# Patient Record
Sex: FEMALE | Race: ASIAN | NOT HISPANIC OR LATINO | Employment: UNEMPLOYED
[De-identification: names, ages, dates, MRNs, and addresses within clinical notes are randomized per-mention and may not be internally consistent; named-entity substitution may affect disease eponyms.]

---

## 2017-05-30 ENCOUNTER — HOSPITAL (OUTPATIENT)
Dept: OTHER | Age: 37
End: 2017-05-30
Attending: OBSTETRICS & GYNECOLOGY

## 2017-07-17 ENCOUNTER — HOSPITAL (OUTPATIENT)
Dept: OTHER | Age: 37
End: 2017-07-17
Attending: OBSTETRICS & GYNECOLOGY

## 2017-07-18 LAB
ANALYZER ANC (IANC): ABNORMAL
ERYTHROCYTE [DISTWIDTH] IN BLOOD: 12.3 % (ref 11–15)
HEMATOCRIT: 32.2 % (ref 36–46.5)
HGB BLD-MCNC: 11.1 GM/DL (ref 12–15.5)
MCH RBC QN AUTO: 31.8 PG (ref 26–34)
MCHC RBC AUTO-ENTMCNC: 34.5 GM/DL (ref 32–36.5)
MCV RBC AUTO: 92.3 FL (ref 78–100)
PLATELET # BLD: 295 THOUSAND/MCL (ref 140–450)
RBC # BLD: 3.49 MILLION/MCL (ref 4–5.2)
WBC # BLD: 16.4 THOUSAND/MCL (ref 4.2–11)

## 2019-04-01 ENCOUNTER — TELEPHONE (OUTPATIENT)
Dept: SCHEDULING | Age: 39
End: 2019-04-01

## 2019-04-02 ENCOUNTER — WALK IN (OUTPATIENT)
Dept: URGENT CARE | Age: 39
End: 2019-04-02

## 2019-04-02 VITALS
HEART RATE: 56 BPM | BODY MASS INDEX: 25.19 KG/M2 | DIASTOLIC BLOOD PRESSURE: 72 MMHG | RESPIRATION RATE: 16 BRPM | SYSTOLIC BLOOD PRESSURE: 118 MMHG | WEIGHT: 120 LBS | HEIGHT: 58 IN | TEMPERATURE: 98.3 F

## 2019-04-02 DIAGNOSIS — Z23 NEED FOR PROPHYLACTIC VACCINATION AND INOCULATION AGAINST VIRAL HEPATITIS: Primary | ICD-10-CM

## 2019-04-02 PROCEDURE — 90746 HEPB VACCINE 3 DOSE ADULT IM: CPT | Performed by: NURSE PRACTITIONER

## 2019-04-02 PROCEDURE — 90471 IMMUNIZATION ADMIN: CPT | Performed by: NURSE PRACTITIONER

## 2019-04-02 RX ORDER — TOPIRAMATE 25 MG/1
25 TABLET ORAL 2 TIMES DAILY
COMMUNITY

## 2019-04-02 RX ORDER — METOPROLOL SUCCINATE 25 MG/1
25 TABLET, EXTENDED RELEASE ORAL DAILY
COMMUNITY

## 2019-04-02 RX ORDER — LEVOTHYROXINE SODIUM 0.05 MG/1
50 TABLET ORAL DAILY
COMMUNITY

## 2019-04-05 ENCOUNTER — WALK IN (OUTPATIENT)
Dept: URGENT CARE | Age: 39
End: 2019-04-05

## 2019-04-05 VITALS — TEMPERATURE: 97.6 F

## 2019-04-05 DIAGNOSIS — Z23 IMMUNIZATION DUE: Primary | ICD-10-CM

## 2019-04-05 PROCEDURE — 90471 IMMUNIZATION ADMIN: CPT | Performed by: NURSE PRACTITIONER

## 2019-04-05 PROCEDURE — 90716 VAR VACCINE LIVE SUBQ: CPT | Performed by: NURSE PRACTITIONER

## 2019-05-03 ENCOUNTER — WALK IN (OUTPATIENT)
Dept: URGENT CARE | Age: 39
End: 2019-05-03

## 2019-05-03 VITALS — TEMPERATURE: 97.8 F

## 2019-05-03 DIAGNOSIS — Z23 IMMUNIZATION DUE: Primary | ICD-10-CM

## 2019-05-03 PROCEDURE — 90471 IMMUNIZATION ADMIN: CPT | Performed by: NURSE PRACTITIONER

## 2019-05-03 PROCEDURE — 90716 VAR VACCINE LIVE SUBQ: CPT | Performed by: NURSE PRACTITIONER

## 2019-05-03 PROCEDURE — 90472 IMMUNIZATION ADMIN EACH ADD: CPT | Performed by: NURSE PRACTITIONER

## 2019-05-03 PROCEDURE — 90746 HEPB VACCINE 3 DOSE ADULT IM: CPT | Performed by: NURSE PRACTITIONER

## 2019-09-25 ENCOUNTER — WALK IN (OUTPATIENT)
Dept: URGENT CARE | Age: 39
End: 2019-09-25

## 2019-09-25 VITALS
RESPIRATION RATE: 16 BRPM | TEMPERATURE: 98.3 F | WEIGHT: 124 LBS | HEIGHT: 58 IN | SYSTOLIC BLOOD PRESSURE: 120 MMHG | OXYGEN SATURATION: 99 % | HEART RATE: 64 BPM | DIASTOLIC BLOOD PRESSURE: 80 MMHG | BODY MASS INDEX: 26.03 KG/M2

## 2019-09-25 DIAGNOSIS — Z00.00 PE (PHYSICAL EXAM), ROUTINE: ICD-10-CM

## 2019-09-25 DIAGNOSIS — Z23 INFLUENZA VACCINE NEEDED: Primary | ICD-10-CM

## 2019-09-25 DIAGNOSIS — Z02.0 SCHOOL PHYSICAL EXAM: ICD-10-CM

## 2019-09-25 PROBLEM — G43.909 MIGRAINES: Status: ACTIVE | Noted: 2019-09-25

## 2019-09-25 PROBLEM — I10 HYPERTENSION: Status: ACTIVE | Noted: 2019-09-25

## 2019-09-25 PROBLEM — E03.9 HYPOTHYROIDISM: Status: ACTIVE | Noted: 2019-09-25

## 2019-09-25 PROCEDURE — 99395 PREV VISIT EST AGE 18-39: CPT | Performed by: NURSE PRACTITIONER

## 2019-09-25 PROCEDURE — 90471 IMMUNIZATION ADMIN: CPT | Performed by: NURSE PRACTITIONER

## 2019-09-25 PROCEDURE — 90686 IIV4 VACC NO PRSV 0.5 ML IM: CPT | Performed by: NURSE PRACTITIONER

## 2019-09-25 ASSESSMENT — ENCOUNTER SYMPTOMS
HEMATOLOGIC/LYMPHATIC NEGATIVE: 1
NEUROLOGICAL NEGATIVE: 1
GASTROINTESTINAL NEGATIVE: 1
RESPIRATORY NEGATIVE: 1
EYES NEGATIVE: 1
ENDOCRINE NEGATIVE: 1
CONSTITUTIONAL NEGATIVE: 1
PSYCHIATRIC NEGATIVE: 1

## 2019-10-09 ENCOUNTER — WALK IN (OUTPATIENT)
Dept: URGENT CARE | Age: 39
End: 2019-10-09

## 2019-10-09 DIAGNOSIS — Z23 NEED FOR IMMUNIZATION AGAINST INFLUENZA: Primary | ICD-10-CM

## 2019-10-09 PROCEDURE — 90471 IMMUNIZATION ADMIN: CPT | Performed by: NURSE PRACTITIONER

## 2019-10-09 PROCEDURE — 90746 HEPB VACCINE 3 DOSE ADULT IM: CPT | Performed by: NURSE PRACTITIONER

## 2020-12-18 DIAGNOSIS — D21.9 FIBROID: ICD-10-CM

## 2020-12-18 DIAGNOSIS — Z98.890 HISTORY OF MYOMECTOMY: Primary | ICD-10-CM

## 2021-01-05 ENCOUNTER — HOSPITAL ENCOUNTER (OUTPATIENT)
Dept: MAMMOGRAPHY | Age: 41
Discharge: HOME | End: 2021-01-05
Attending: OBSTETRICS & GYNECOLOGY

## 2021-01-05 DIAGNOSIS — Z12.31 ENCOUNTER FOR SCREENING MAMMOGRAM FOR BREAST CANCER: ICD-10-CM

## 2021-01-05 PROCEDURE — 77063 BREAST TOMOSYNTHESIS BI: CPT

## 2021-01-05 PROCEDURE — 77067 SCR MAMMO BI INCL CAD: CPT

## 2021-01-12 ENCOUNTER — OFFICE VISIT (OUTPATIENT)
Dept: MATERNAL FETAL MEDICINE | Age: 41
End: 2021-01-12
Attending: OBSTETRICS & GYNECOLOGY

## 2021-01-12 DIAGNOSIS — D21.9 FIBROID: Primary | ICD-10-CM

## 2021-01-12 DIAGNOSIS — N83.202 CYST OF LEFT OVARY: ICD-10-CM

## 2021-01-12 PROCEDURE — 76830 TRANSVAGINAL US NON-OB: CPT | Performed by: OBSTETRICS & GYNECOLOGY

## 2021-01-12 PROCEDURE — 76856 US EXAM PELVIC COMPLETE: CPT | Performed by: OBSTETRICS & GYNECOLOGY

## 2021-01-27 DIAGNOSIS — D21.9 FIBROID: ICD-10-CM

## 2021-01-27 DIAGNOSIS — N83.209 CYST OF OVARY, UNSPECIFIED LATERALITY: Primary | ICD-10-CM

## 2021-04-06 ENCOUNTER — APPOINTMENT (OUTPATIENT)
Dept: MATERNAL FETAL MEDICINE | Age: 41
End: 2021-04-06
Attending: OBSTETRICS & GYNECOLOGY

## 2021-04-22 ENCOUNTER — OFFICE VISIT (OUTPATIENT)
Dept: MATERNAL FETAL MEDICINE | Age: 41
End: 2021-04-22
Attending: OBSTETRICS & GYNECOLOGY

## 2021-04-22 DIAGNOSIS — D21.9 FIBROIDS: Primary | ICD-10-CM

## 2021-04-22 DIAGNOSIS — N83.202 CYST OF LEFT OVARY: ICD-10-CM

## 2021-04-22 PROCEDURE — 76830 TRANSVAGINAL US NON-OB: CPT | Performed by: OBSTETRICS & GYNECOLOGY

## 2021-04-22 PROCEDURE — 76856 US EXAM PELVIC COMPLETE: CPT | Performed by: OBSTETRICS & GYNECOLOGY

## 2021-06-01 ENCOUNTER — OFFICE VISIT (OUTPATIENT)
Dept: FAMILY MEDICINE CLINIC | Facility: CLINIC | Age: 41
End: 2021-06-01
Payer: COMMERCIAL

## 2021-06-01 VITALS
HEART RATE: 71 BPM | DIASTOLIC BLOOD PRESSURE: 77 MMHG | SYSTOLIC BLOOD PRESSURE: 112 MMHG | WEIGHT: 126.38 LBS | RESPIRATION RATE: 16 BRPM | HEIGHT: 58.25 IN | TEMPERATURE: 97 F | BODY MASS INDEX: 26.17 KG/M2

## 2021-06-01 DIAGNOSIS — G43.009 MIGRAINE WITHOUT AURA AND WITHOUT STATUS MIGRAINOSUS, NOT INTRACTABLE: Primary | ICD-10-CM

## 2021-06-01 DIAGNOSIS — E03.9 HYPOTHYROIDISM, ACQUIRED: ICD-10-CM

## 2021-06-01 PROCEDURE — 99203 OFFICE O/P NEW LOW 30 MIN: CPT | Performed by: FAMILY MEDICINE

## 2021-06-01 PROCEDURE — 3078F DIAST BP <80 MM HG: CPT | Performed by: FAMILY MEDICINE

## 2021-06-01 PROCEDURE — 3074F SYST BP LT 130 MM HG: CPT | Performed by: FAMILY MEDICINE

## 2021-06-01 PROCEDURE — 3008F BODY MASS INDEX DOCD: CPT | Performed by: FAMILY MEDICINE

## 2021-06-01 RX ORDER — SUMATRIPTAN 50 MG/1
50 TABLET, FILM COATED ORAL EVERY 2 HOUR PRN
Refills: 0 | COMMUNITY
Start: 2021-06-01

## 2021-06-01 RX ORDER — LEVOTHYROXINE SODIUM 50 MCG
TABLET ORAL
COMMUNITY
Start: 2021-03-16

## 2021-06-01 RX ORDER — LEVOTHYROXINE SODIUM 0.03 MG/1
25 TABLET ORAL
Refills: 0 | COMMUNITY
Start: 2021-06-01 | End: 2021-06-01

## 2021-06-01 RX ORDER — TOPIRAMATE 25 MG/1
TABLET ORAL
COMMUNITY
Start: 2021-06-01 | End: 2021-06-01

## 2021-06-01 RX ORDER — ACETAMINOPHEN AND CODEINE PHOSPHATE 120; 12 MG/5ML; MG/5ML
SOLUTION ORAL
COMMUNITY
Start: 2021-04-17

## 2021-06-01 NOTE — PROGRESS NOTES
HPI/Subjective:   Patient ID: Beverley Sheridan is a 36year old female. Patient presents to establish care and evaluation of headaches as below. Headache   This is a chronic problem. The current episode started more than 1 year ago.  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 At appointment with  Nathan Room. History of myomectomy 2017. Mother diabetes, father hypertension. Taking norethindrone OCP. Diagnosed with onset of migraines 2017.   Experiences throbbing unilateral left-sided o

## 2021-07-15 ENCOUNTER — TELEMEDICINE (OUTPATIENT)
Dept: FAMILY MEDICINE CLINIC | Facility: CLINIC | Age: 41
End: 2021-07-15
Payer: COMMERCIAL

## 2021-07-15 DIAGNOSIS — G43.009 MIGRAINE WITHOUT AURA AND WITHOUT STATUS MIGRAINOSUS, NOT INTRACTABLE: Primary | ICD-10-CM

## 2021-07-15 PROCEDURE — 99212 OFFICE O/P EST SF 10 MIN: CPT | Performed by: FAMILY MEDICINE

## 2021-07-15 NOTE — PROGRESS NOTES
HPI/Subjective:   Patient ID: Gilmar Dutta is a 36year old female. This visit is conducted using Telemedicine with live, interactive video and audio.     Patient has been referred to the [facility] website at [email or web address] to review the yearl

## 2021-07-24 ENCOUNTER — PATIENT MESSAGE (OUTPATIENT)
Dept: FAMILY MEDICINE CLINIC | Facility: CLINIC | Age: 41
End: 2021-07-24

## 2021-07-24 DIAGNOSIS — Z11.1 SCREENING-PULMONARY TB: Primary | ICD-10-CM

## 2021-07-25 NOTE — TELEPHONE ENCOUNTER
Dr Bishop Flower message, pended order, please check if this is the right dx code before signing. From: Laura Espinosa  To: Yusef Hope. Clint Charles MD  Sent: 7/24/2021  2:22 PM CDT  Subject: Other    Do you do TB Test (Quantiferon) ?  I need to do the test for

## 2021-07-27 ENCOUNTER — LAB ENCOUNTER (OUTPATIENT)
Dept: LAB | Age: 41
End: 2021-07-27
Attending: FAMILY MEDICINE
Payer: COMMERCIAL

## 2021-07-27 DIAGNOSIS — Z11.1 SCREENING-PULMONARY TB: ICD-10-CM

## 2021-07-27 PROCEDURE — 86480 TB TEST CELL IMMUN MEASURE: CPT

## 2021-07-27 PROCEDURE — 36415 COLL VENOUS BLD VENIPUNCTURE: CPT

## 2021-07-29 LAB
M TB IFN-G CD4+ T-CELLS BLD-ACNC: 0.11 IU/ML
M TB TUBERC IFN-G BLD QL: NEGATIVE
M TB TUBERC IGNF/MITOGEN IGNF CONTROL: >10 IU/ML
QFT TB1 AG MINUS NIL: 0.21 IU/ML
QFT TB2 AG MINUS NIL: 0.16 IU/ML

## 2021-11-19 DIAGNOSIS — O34.10 TUMORS OF BODY OF UTERUS, ANTEPARTUM CONDITION OR COMPLICATION: Primary | ICD-10-CM

## 2021-12-21 ENCOUNTER — APPOINTMENT (OUTPATIENT)
Dept: MATERNAL FETAL MEDICINE | Age: 41
End: 2021-12-21
Attending: OBSTETRICS & GYNECOLOGY

## 2022-01-17 ENCOUNTER — APPOINTMENT (OUTPATIENT)
Dept: MATERNAL FETAL MEDICINE | Age: 42
End: 2022-01-17
Attending: OBSTETRICS & GYNECOLOGY

## 2022-02-07 ENCOUNTER — APPOINTMENT (OUTPATIENT)
Dept: MATERNAL FETAL MEDICINE | Age: 42
End: 2022-02-07
Attending: OBSTETRICS & GYNECOLOGY

## 2022-03-17 ENCOUNTER — WALK IN (OUTPATIENT)
Dept: URGENT CARE | Age: 42
End: 2022-03-17

## 2022-03-17 VITALS
DIASTOLIC BLOOD PRESSURE: 80 MMHG | SYSTOLIC BLOOD PRESSURE: 130 MMHG | OXYGEN SATURATION: 98 % | WEIGHT: 128.2 LBS | BODY MASS INDEX: 26.91 KG/M2 | TEMPERATURE: 98 F | RESPIRATION RATE: 18 BRPM | HEIGHT: 58 IN | HEART RATE: 59 BPM

## 2022-03-17 DIAGNOSIS — Z02.1 PHYSICAL EXAM, PRE-EMPLOYMENT: Primary | ICD-10-CM

## 2022-03-17 PROCEDURE — X0945 SELF PAY APN OR PA PERFORMED ADMINISTRATIVE PHYSICAL: HCPCS | Performed by: NURSE PRACTITIONER

## 2022-03-17 ASSESSMENT — ENCOUNTER SYMPTOMS
SHORTNESS OF BREATH: 0
CHEST TIGHTNESS: 0
RESPIRATORY NEGATIVE: 1
WEAKNESS: 0
EYES NEGATIVE: 1
ABDOMINAL PAIN: 0
CHILLS: 0
SEIZURES: 0
COUGH: 0
DIARRHEA: 0
PSYCHIATRIC NEGATIVE: 1
ALLERGIC/IMMUNOLOGIC NEGATIVE: 1
NAUSEA: 0
NEUROLOGICAL NEGATIVE: 1
WHEEZING: 0
SINUS PAIN: 0
APPETITE CHANGE: 0
HEMATOLOGIC/LYMPHATIC NEGATIVE: 1
FATIGUE: 0
EYE PAIN: 0
RHINORRHEA: 0
CONSTIPATION: 0
GASTROINTESTINAL NEGATIVE: 1
ENDOCRINE NEGATIVE: 1
LIGHT-HEADEDNESS: 0
CONSTITUTIONAL NEGATIVE: 1
DIZZINESS: 0
VOMITING: 0
SINUS PRESSURE: 0
FEVER: 0
SORE THROAT: 0
HEADACHES: 0
TROUBLE SWALLOWING: 0

## 2022-03-25 ENCOUNTER — OFFICE VISIT (OUTPATIENT)
Dept: FAMILY MEDICINE CLINIC | Facility: CLINIC | Age: 42
End: 2022-03-25
Payer: COMMERCIAL

## 2022-03-25 ENCOUNTER — LAB ENCOUNTER (OUTPATIENT)
Dept: LAB | Age: 42
End: 2022-03-25
Attending: FAMILY MEDICINE
Payer: COMMERCIAL

## 2022-03-25 VITALS
RESPIRATION RATE: 19 BRPM | SYSTOLIC BLOOD PRESSURE: 138 MMHG | DIASTOLIC BLOOD PRESSURE: 82 MMHG | HEART RATE: 78 BPM | HEIGHT: 58 IN | BODY MASS INDEX: 26.66 KG/M2 | WEIGHT: 127 LBS | TEMPERATURE: 99 F

## 2022-03-25 DIAGNOSIS — Z11.1 SCREENING-PULMONARY TB: Primary | ICD-10-CM

## 2022-03-25 DIAGNOSIS — Z11.1 SCREENING-PULMONARY TB: ICD-10-CM

## 2022-03-25 PROCEDURE — 86480 TB TEST CELL IMMUN MEASURE: CPT

## 2022-03-25 PROCEDURE — 3079F DIAST BP 80-89 MM HG: CPT | Performed by: FAMILY MEDICINE

## 2022-03-25 PROCEDURE — 3008F BODY MASS INDEX DOCD: CPT | Performed by: FAMILY MEDICINE

## 2022-03-25 PROCEDURE — 3075F SYST BP GE 130 - 139MM HG: CPT | Performed by: FAMILY MEDICINE

## 2022-03-25 PROCEDURE — 99213 OFFICE O/P EST LOW 20 MIN: CPT | Performed by: FAMILY MEDICINE

## 2022-03-25 PROCEDURE — 36415 COLL VENOUS BLD VENIPUNCTURE: CPT

## 2022-03-25 RX ORDER — TOPIRAMATE 25 MG/1
TABLET ORAL
Refills: 0 | COMMUNITY
Start: 2022-03-25

## 2022-03-28 LAB
M TB IFN-G CD4+ T-CELLS BLD-ACNC: 0.27 IU/ML
M TB TUBERC IFN-G BLD QL: NEGATIVE
M TB TUBERC IGNF/MITOGEN IGNF CONTROL: >10 IU/ML
QFT TB1 AG MINUS NIL: 0.34 IU/ML
QFT TB2 AG MINUS NIL: 0.17 IU/ML

## 2022-06-29 ENCOUNTER — V-VISIT (OUTPATIENT)
Dept: FAMILY MEDICINE | Age: 42
End: 2022-06-29

## 2022-06-29 DIAGNOSIS — R69 DIAGNOSIS DEFERRED: Primary | ICD-10-CM

## 2022-06-29 RX ORDER — NORETHINDRONE 0.35 MG/1
TABLET ORAL
COMMUNITY
Start: 2022-05-03

## 2022-09-16 ENCOUNTER — HOSPITAL ENCOUNTER (OUTPATIENT)
Dept: MAMMOGRAPHY | Age: 42
Discharge: HOME | End: 2022-09-16

## 2022-09-16 DIAGNOSIS — Z12.31 ENCOUNTER FOR SCREENING MAMMOGRAM FOR MALIGNANT NEOPLASM OF BREAST: ICD-10-CM

## 2022-09-16 PROCEDURE — 77063 BREAST TOMOSYNTHESIS BI: CPT

## 2022-09-24 ENCOUNTER — TELEMEDICINE (OUTPATIENT)
Dept: FAMILY MEDICINE CLINIC | Facility: CLINIC | Age: 42
End: 2022-09-24

## 2022-09-24 ENCOUNTER — PATIENT MESSAGE (OUTPATIENT)
Dept: FAMILY MEDICINE CLINIC | Facility: CLINIC | Age: 42
End: 2022-09-24

## 2022-09-24 DIAGNOSIS — R05.2 SUBACUTE COUGH: Primary | ICD-10-CM

## 2022-09-24 DIAGNOSIS — Z86.16 HISTORY OF COVID-19: ICD-10-CM

## 2022-09-24 DIAGNOSIS — J98.01 BRONCHOSPASM: ICD-10-CM

## 2022-09-24 PROCEDURE — 99213 OFFICE O/P EST LOW 20 MIN: CPT | Performed by: FAMILY MEDICINE

## 2022-09-24 RX ORDER — NAPROXEN 375 MG/1
375 TABLET ORAL 2 TIMES DAILY WITH MEALS
Qty: 40 TABLET | Refills: 0 | Status: SHIPPED | OUTPATIENT
Start: 2022-09-24 | End: 2022-09-24

## 2022-09-24 RX ORDER — NAPROXEN 375 MG/1
375 TABLET ORAL 2 TIMES DAILY WITH MEALS
Qty: 40 TABLET | Refills: 0 | Status: SHIPPED | OUTPATIENT
Start: 2022-09-24

## 2022-09-24 RX ORDER — MOMETASONE FUROATE AND FORMOTEROL FUMARATE DIHYDRATE 200; 5 UG/1; UG/1
1 AEROSOL RESPIRATORY (INHALATION) 2 TIMES DAILY
Qty: 1 EACH | Refills: 1 | Status: SHIPPED | OUTPATIENT
Start: 2022-09-24

## 2022-09-24 RX ORDER — MOMETASONE FUROATE AND FORMOTEROL FUMARATE DIHYDRATE 200; 5 UG/1; UG/1
1 AEROSOL RESPIRATORY (INHALATION) 2 TIMES DAILY
Qty: 1 EACH | Refills: 1 | Status: SHIPPED | OUTPATIENT
Start: 2022-09-24 | End: 2022-09-24

## 2022-09-26 NOTE — TELEPHONE ENCOUNTER
PA completed. Approved for 2 months.   8/27/22-11/25/22    Case ID# [id number]    Left message for patient informing her.

## 2023-01-18 RX ORDER — TOPIRAMATE 25 MG/1
25 TABLET ORAL 2 TIMES DAILY
Qty: 180 TABLET | Refills: 1 | Status: SHIPPED | OUTPATIENT
Start: 2023-01-18

## 2023-01-18 NOTE — TELEPHONE ENCOUNTER
Please review. Protocol passed but never prescribed by doctor.     Requested Prescriptions   Pending Prescriptions Disp Refills    topiramate 25 MG Oral Tab  0     Sig: AT THE START OF THERAPY TAKE 1 TABLET DAILY FOR 3 DAYS THEN TAKE 1 TABLET IN THE MORNING AND 1 TABLET AT NIGHT THEREAFTER       Neurology Medications Passed - 1/17/2023  7:52 PM        Passed - In person appointment or virtual visit in the past 6 mos or appointment in next 3 mos     Recent Outpatient Visits              3 months ago Subacute cough    Temitope Zelaya, McGregorCatalino MD    Telemedicine    9 months ago Screening-pulmonary TB    Nader Pederson, David Bess, [facility] Michelle Cramer MD    Office Visit    1 year ago Migraine without aura and without status migrainosus, not intractable    [facility], [place] Bess, [address] [place] [address] MD Yordan    Telemedicine    1 year ago Migraine without aura and without status migrainosus, not intractable    [facility] Michelle Cramer MD    Office Visit          Future Appointments         Provider Department Appt Notes    In 1 month MD Temitope Jennings [place], [facility] Physical assessment for nursing school requirements, medication rx transfer,                    Recent Outpatient Visits              3 months ago Subacute cough    Temitope Zelaya, [facility] Michelle Cramer MD    Telemedicine    9 months ago Screening-pulmonary TB    Temitope Zelaya, [facility] Michelle Cramer MD    Office Visit    1 year ago Migraine without aura and without status migrainosus, not intractable    [facility], [address], [address] [facility] MD Yordan    Telemedicine    1 year ago Migraine without aura and without status migrainosus, not intractable    [facility], [place], Dior Ochoa MD    Office Visit            Future Appointments         Provider Department Appt Notes    In 1 month Roseanna Singh MD [address] [facility], [facility] Physical assessment for nursing school requirements, medication rx transfer,

## 2023-03-08 ENCOUNTER — OFFICE VISIT (OUTPATIENT)
Dept: FAMILY MEDICINE CLINIC | Facility: CLINIC | Age: 43
End: 2023-03-08

## 2023-03-08 ENCOUNTER — LAB ENCOUNTER (OUTPATIENT)
Dept: LAB | Age: 43
End: 2023-03-08
Attending: FAMILY MEDICINE
Payer: COMMERCIAL

## 2023-03-08 VITALS
TEMPERATURE: 98 F | WEIGHT: 129 LBS | DIASTOLIC BLOOD PRESSURE: 88 MMHG | HEIGHT: 58 IN | SYSTOLIC BLOOD PRESSURE: 127 MMHG | BODY MASS INDEX: 27.08 KG/M2 | HEART RATE: 62 BPM

## 2023-03-08 DIAGNOSIS — Z11.1 TUBERCULOSIS SCREENING: ICD-10-CM

## 2023-03-08 DIAGNOSIS — Z00.00 ANNUAL PHYSICAL EXAM: ICD-10-CM

## 2023-03-08 DIAGNOSIS — G43.009 MIGRAINE WITHOUT AURA AND WITHOUT STATUS MIGRAINOSUS, NOT INTRACTABLE: ICD-10-CM

## 2023-03-08 DIAGNOSIS — D25.9 UTERINE LEIOMYOMA, UNSPECIFIED LOCATION: ICD-10-CM

## 2023-03-08 DIAGNOSIS — I10 ESSENTIAL HYPERTENSION: ICD-10-CM

## 2023-03-08 DIAGNOSIS — N85.2 UTERINE ENLARGEMENT: ICD-10-CM

## 2023-03-08 DIAGNOSIS — E03.9 HYPOTHYROIDISM (ACQUIRED): ICD-10-CM

## 2023-03-08 DIAGNOSIS — Z01.419 ENCOUNTER FOR GYNECOLOGICAL EXAMINATION WITHOUT ABNORMAL FINDING: Primary | ICD-10-CM

## 2023-03-08 DIAGNOSIS — Z12.31 BREAST CANCER SCREENING BY MAMMOGRAM: ICD-10-CM

## 2023-03-08 LAB
ANION GAP SERPL CALC-SCNC: 6 MMOL/L (ref 0–18)
BUN BLD-MCNC: 8 MG/DL (ref 7–18)
BUN/CREAT SERPL: 11.9 (ref 10–20)
CALCIUM BLD-MCNC: 8.5 MG/DL (ref 8.5–10.1)
CHLORIDE SERPL-SCNC: 110 MMOL/L (ref 98–112)
CHOLEST SERPL-MCNC: 110 MG/DL (ref ?–200)
CO2 SERPL-SCNC: 24 MMOL/L (ref 21–32)
CREAT BLD-MCNC: 0.67 MG/DL
DEPRECATED RDW RBC AUTO: 44.3 FL (ref 35.1–46.3)
ERYTHROCYTE [DISTWIDTH] IN BLOOD BY AUTOMATED COUNT: 12.5 % (ref 11–15)
FASTING PATIENT LIPID ANSWER: NO
FASTING STATUS PATIENT QL REPORTED: NO
GFR SERPLBLD BASED ON 1.73 SQ M-ARVRAT: 112 ML/MIN/1.73M2 (ref 60–?)
GLUCOSE BLD-MCNC: 88 MG/DL (ref 70–99)
HCT VFR BLD AUTO: 40.6 %
HDLC SERPL-MCNC: 72 MG/DL (ref 40–59)
HGB BLD-MCNC: 13.3 G/DL
LDLC SERPL CALC-MCNC: 24 MG/DL (ref ?–100)
MCH RBC QN AUTO: 31.4 PG (ref 26–34)
MCHC RBC AUTO-ENTMCNC: 32.8 G/DL (ref 31–37)
MCV RBC AUTO: 96 FL
NONHDLC SERPL-MCNC: 38 MG/DL (ref ?–130)
OSMOLALITY SERPL CALC.SUM OF ELEC: 288 MOSM/KG (ref 275–295)
PLATELET # BLD AUTO: 366 10(3)UL (ref 150–450)
POTASSIUM SERPL-SCNC: 3.5 MMOL/L (ref 3.5–5.1)
RBC # BLD AUTO: 4.23 X10(6)UL
SODIUM SERPL-SCNC: 140 MMOL/L (ref 136–145)
TRIGL SERPL-MCNC: 67 MG/DL (ref 30–149)
TSI SER-ACNC: 1.64 MIU/ML (ref 0.36–3.74)
VLDLC SERPL CALC-MCNC: 9 MG/DL (ref 0–30)
WBC # BLD AUTO: 10 X10(3) UL (ref 4–11)

## 2023-03-08 PROCEDURE — 99396 PREV VISIT EST AGE 40-64: CPT | Performed by: FAMILY MEDICINE

## 2023-03-08 PROCEDURE — 36415 COLL VENOUS BLD VENIPUNCTURE: CPT

## 2023-03-08 PROCEDURE — 3008F BODY MASS INDEX DOCD: CPT | Performed by: FAMILY MEDICINE

## 2023-03-08 PROCEDURE — 80061 LIPID PANEL: CPT

## 2023-03-08 PROCEDURE — 86480 TB TEST CELL IMMUN MEASURE: CPT

## 2023-03-08 PROCEDURE — 80048 BASIC METABOLIC PNL TOTAL CA: CPT

## 2023-03-08 PROCEDURE — 85027 COMPLETE CBC AUTOMATED: CPT

## 2023-03-08 PROCEDURE — 3079F DIAST BP 80-89 MM HG: CPT | Performed by: FAMILY MEDICINE

## 2023-03-08 PROCEDURE — 3074F SYST BP LT 130 MM HG: CPT | Performed by: FAMILY MEDICINE

## 2023-03-08 PROCEDURE — 84443 ASSAY THYROID STIM HORMONE: CPT

## 2023-03-08 RX ORDER — METOPROLOL SUCCINATE 25 MG/1
25 TABLET, EXTENDED RELEASE ORAL DAILY
COMMUNITY
Start: 2022-09-09

## 2023-03-09 ENCOUNTER — PATIENT MESSAGE (OUTPATIENT)
Dept: FAMILY MEDICINE CLINIC | Facility: CLINIC | Age: 43
End: 2023-03-09

## 2023-03-10 LAB
M TB IFN-G CD4+ T-CELLS BLD-ACNC: 0.64 IU/ML
M TB TUBERC IFN-G BLD QL: NEGATIVE
M TB TUBERC IGNF/MITOGEN IGNF CONTROL: >10 IU/ML
QFT TB1 AG MINUS NIL: 0.19 IU/ML
QFT TB2 AG MINUS NIL: 0.12 IU/ML

## 2023-03-10 NOTE — TELEPHONE ENCOUNTER
From: Perla Gaming  To: Humphrey Cardoso. Renuka Jerez MD  Sent: 3/9/2023 4:50 PM CST  Subject: TB quantiferon test result    I just want to follow up on the result or inquire about the turn around time.  Thank you

## 2023-03-15 RX ORDER — ACETAMINOPHEN AND CODEINE PHOSPHATE 120; 12 MG/5ML; MG/5ML
0.35 SOLUTION ORAL DAILY
Qty: 3 EACH | Refills: 3 | OUTPATIENT
Start: 2023-03-15

## 2023-03-27 LAB — HPV I/H RISK 1 DNA SPEC QL NAA+PROBE: NEGATIVE

## 2023-04-05 RX ORDER — ACETAMINOPHEN AND CODEINE PHOSPHATE 120; 12 MG/5ML; MG/5ML
0.35 SOLUTION ORAL DAILY
Qty: 28 TABLET | Refills: 0 | Status: SHIPPED | OUTPATIENT
Start: 2023-04-05

## 2023-04-05 NOTE — TELEPHONE ENCOUNTER
Protocol failed or has No Protocol, please review    Routing as Dr. Rocco Silva  is out of office ,  med is listed as patient reported   THINPREP PAP WITH HPV REFLEX REQUEST: Patient Communication     Append Comments   Seen    Your Pap smear does not show any signs of cancer, but they were some changes of \"undetermined significance\". The test for the HPV virus which causes cervical cancer is negative. At this time, I recommend we repeat the Pap smear in 1 year.    Written by Brien Sever, MD on 3/27/2023  4:26 PM CDT  Seen by patient Martir Mcdonald on 4/4/2023  9:09 AM        Requested Prescriptions   Pending Prescriptions Disp Refills    Norethindrone 0.35 MG Oral Tab 28 tablet 0       Gynecology Medication Protocol Failed - 4/4/2023  9:04 AM        Failed - Pass dependent on manual look-up of last PAP and patient compliance with PAP follow up recommendations        Passed - In person appointment or virtual visit in the past 12 mos or appointment in next 3 mos     Recent Outpatient Visits              4 weeks ago Encounter for gynecological examination without abnormal finding    [address], [address] Aden Donahue MD    Office Visit    6 months ago Subacute cough    Ronald Muhammad [place], Carmen Castaneda MD    Telemedicine    1 year ago Screening-pulmonary TB    [address] Aden Donahue MD    Office Visit    1 year ago Migraine without aura and without status migrainosus, not intractable    [facility], [address], [address] [facility] MD Yordan    Telemedicine    1 year ago Migraine without aura and without status migrainosus, not intractable    [facility], [facility], [address] [facility] MD Yordan    Office Visit          Future Appointments         Provider Department Appt Notes    In 5 months Aden Donahue MD [address], [address] as per the doctor, need a follow-up on hypertension every 6 months                  Future Appointments         Provider Department Appt Notes    In 5 months Yohannes Cheung MD [address], [facility], [facility] as per the doctor, need a follow-up on hypertension every 6 months          Recent Outpatient Visits              4 weeks ago Encounter for gynecological examination without abnormal finding    [address] [facility], [address] [facility] MD Yordan    Office Visit    6 months ago Subacute cough    [address] [facility] Yohannes Cheung MD    Telemedicine    1 year ago Screening-pulmonary TB    [address], [address], [address] [facility] [address], MD    Office Visit    1 year ago Migraine without aura and without status migrainosus, not intractable    [facility], [address], [address] [facility], MD    Telemedicine    1 year ago Migraine without aura and without status migrainosus, not intractable    [address], [address], [address] [facility] [address], MD    Office Visit

## 2023-06-27 ENCOUNTER — OFFICE VISIT (OUTPATIENT)
Dept: FAMILY MEDICINE CLINIC | Facility: CLINIC | Age: 43
End: 2023-06-27

## 2023-06-27 VITALS
HEART RATE: 78 BPM | SYSTOLIC BLOOD PRESSURE: 121 MMHG | WEIGHT: 134.38 LBS | BODY MASS INDEX: 28 KG/M2 | TEMPERATURE: 98 F | DIASTOLIC BLOOD PRESSURE: 85 MMHG

## 2023-06-27 DIAGNOSIS — Z31.69 ENCOUNTER FOR PRECONCEPTION CONSULTATION: ICD-10-CM

## 2023-06-27 DIAGNOSIS — I10 ESSENTIAL HYPERTENSION: Primary | ICD-10-CM

## 2023-06-27 DIAGNOSIS — D25.9 UTERINE LEIOMYOMA, UNSPECIFIED LOCATION: ICD-10-CM

## 2023-06-27 PROCEDURE — 3079F DIAST BP 80-89 MM HG: CPT | Performed by: FAMILY MEDICINE

## 2023-06-27 PROCEDURE — 3074F SYST BP LT 130 MM HG: CPT | Performed by: FAMILY MEDICINE

## 2023-06-27 PROCEDURE — 99213 OFFICE O/P EST LOW 20 MIN: CPT | Performed by: FAMILY MEDICINE

## 2023-06-27 RX ORDER — MAGNESIUM OXIDE 400 MG (241.3 MG MAGNESIUM) TABLET
800 TABLET DAILY
Qty: 30 TABLET | Refills: 0 | Status: SHIPPED | OUTPATIENT
Start: 2023-06-27 | End: 2023-07-03

## 2023-07-03 RX ORDER — MAGNESIUM OXIDE 400 MG (241.3 MG MAGNESIUM) TABLET
800 TABLET DAILY
Qty: 30 TABLET | Refills: 0 | Status: SHIPPED | OUTPATIENT
Start: 2023-07-03

## 2023-10-03 ENCOUNTER — HOSPITAL ENCOUNTER (OUTPATIENT)
Dept: ULTRASOUND IMAGING | Age: 43
Discharge: HOME | End: 2023-10-03
Attending: FAMILY MEDICINE
Payer: COMMERCIAL

## 2023-10-03 DIAGNOSIS — N85.2 UTERINE ENLARGEMENT: ICD-10-CM

## 2023-10-03 PROCEDURE — 76856 US EXAM PELVIC COMPLETE: CPT | Performed by: FAMILY MEDICINE

## 2023-10-03 PROCEDURE — 76830 TRANSVAGINAL US NON-OB: CPT | Performed by: FAMILY MEDICINE

## 2023-10-18 RX ORDER — LEVOTHYROXINE SODIUM 50 MCG
50 TABLET ORAL
Qty: 90 TABLET | Refills: 1 | Status: SHIPPED | OUTPATIENT
Start: 2023-10-18

## 2023-10-18 NOTE — TELEPHONE ENCOUNTER
Rx listed as historical. pls advise, thanks in advance.          Refill passed per [facility] protocol   Requested Prescriptions   Pending Prescriptions Disp Refills    SYNTHROID 50 MCG Oral Tab  0       Thyroid Medication Protocol Passed - 10/17/2023  7:59 AM        Passed - TSH in past 12 months        Passed - Last TSH value is normal     Lab Results   Component Value Date    TSH 1.640 03/08/2023                 Passed - In person appointment or virtual visit in the past 12 mos or appointment in next 3 mos     Recent Outpatient Visits              3 months ago Essential hypertension    [facility] Budge, MD    Office Visit    7 months ago Encounter for gynecological examination without abnormal finding    [facility], MD    Office Visit    1 year ago Subacute cough    [facility] [address], MD    Telemedicine    1 year ago Screening-pulmonary TB    [facility] [address], MD    Office Visit    2 years ago Migraine without aura and without status migrainosus, not intractable    [facility], [address], [address] [facility] MD Yordan    Telemedicine          Future Appointments         Provider Department Appt Notes    In 2 days DO Esme Larson, [address], [address]     In 1 month [address] [place] [address] Rd

## 2023-10-19 ENCOUNTER — OFFICE VISIT (OUTPATIENT)
Dept: OBGYN CLINIC | Facility: CLINIC | Age: 43
End: 2023-10-19
Payer: COMMERCIAL

## 2023-10-19 VITALS
WEIGHT: 142 LBS | SYSTOLIC BLOOD PRESSURE: 124 MMHG | HEIGHT: 58 IN | BODY MASS INDEX: 29.81 KG/M2 | DIASTOLIC BLOOD PRESSURE: 84 MMHG

## 2023-10-19 DIAGNOSIS — Z31.69 ENCOUNTER FOR PRECONCEPTION CONSULTATION: ICD-10-CM

## 2023-10-19 DIAGNOSIS — D21.9 FIBROIDS: Primary | ICD-10-CM

## 2023-10-19 PROBLEM — I10 HYPERTENSION: Status: ACTIVE | Noted: 2019-09-25

## 2023-10-19 PROCEDURE — 3074F SYST BP LT 130 MM HG: CPT | Performed by: OBSTETRICS & GYNECOLOGY

## 2023-10-19 PROCEDURE — 3008F BODY MASS INDEX DOCD: CPT | Performed by: OBSTETRICS & GYNECOLOGY

## 2023-10-19 PROCEDURE — 99203 OFFICE O/P NEW LOW 30 MIN: CPT | Performed by: OBSTETRICS & GYNECOLOGY

## 2023-10-19 PROCEDURE — 3079F DIAST BP 80-89 MM HG: CPT | Performed by: OBSTETRICS & GYNECOLOGY

## 2023-10-19 NOTE — PROGRESS NOTES
New Patient GYN History and Physical  [facility]    CHIEF COMPLAINT:  Patient presents with:  Ultrasound: Pt states she was told by previous OB to get US every 6 months due to a myomectomy since she was planning to conceive but hasn't, referred by ; Pelvic US f/u 10/03/23   HISTORY OF PRESENT ILLNESS:   Dariana Birch is a 43year old female   who presents for    Consult for fibroids. Surgery 2017 laparoscopy myomectomy and was told to f/u q 6 months to monitor. Patient's last menstrual period was 2023 (exact date). Monthly / regular, 4-5 days (used last 10 d), light flow, no cramps now    + sex with   No pain - before surgery she had pain with sex    G0 - still interested in pregnancy. Last pap smear 3/2023 ascus hpv neg    No h/o STI      PAST MEDICAL HISTORY:   Past Medical History:   Diagnosis Date    Fibroids     HTN (hypertension)     Hypothyroid     Migraine         PAST SURGICAL HISTORY:   Past Surgical History:   Procedure Laterality Date    Breast biopsy Left     Prior myomectomy          PAST OB HISTORY:  OB History    Para Term  AB Living   0 0 0 0 0 0   SAB IAB Ectopic Multiple Live Births   0 0 0 0 0       CURRENT MEDICATIONS:      Current Outpatient Medications:     SYNTHROID 50 MCG Oral Tab, Take 1 tablet (50 mcg total) by mouth before breakfast., Disp: 90 tablet, Rfl: 1    metoprolol succinate ER 25 MG Oral Tablet 24 Hr, Take 1 tablet (25 mg total) by mouth daily. , Disp: 90 tablet, Rfl: 3    SUMAtriptan Succinate 50 MG Oral Tab, Take 1 tablet (50 mg total) by mouth every 2 (two) hours as needed for Migraine. , Disp: , Rfl: 0    folic acid 782 MCG Oral Tab, Take 1 tablet (800 mcg total) by mouth daily.  (Patient not taking: Reported on 10/19/2023), Disp: 30 tablet, Rfl: 0    ALLERGIES:  No Known Allergies    SOCIAL HISTORY:  Social History    Socioeconomic History      Marital status:     Tobacco Use      Smoking status: Former Packs/day: 1        Types: Cigarettes        Quit date: 2015        Years since quittin.8      Smokeless tobacco: Never    Vaping Use      Vaping Use: Never used    Substance and Sexual Activity      Alcohol use: Yes        Comment: 1-2 times per week      Drug use: Not Currently      Sexual activity: Yes        Partners: Male      FAMILY HISTORY:  Family History   Problem Relation Age of Onset    Other (prostate problems) Father     Diabetes Mother         pre-diabetes    Soft Tissue Cancer Mother     Heart Disease Sister     No Known Problems Sister     No Known Problems Brother      ASSESSMENTS:  PHYSICAL EXAM:   Patient's last menstrual period was 2023 (exact date). 10/19/23  1610   BP: 124/84   Weight: 142 lb (64.4 kg)   Height: 58\"     CONSTITUTIONAL: Awake, alert, cooperative, no apparent distress, and appears stated age   NECK: Supple, symmetrical, trachea midline, no adenopathy, thyroid symmetric, not enlarged and no tenderness  LUNGS: No excess work of breathing  MUSCULOSKELETAL: There is no redness, warmth, or swelling of the joints. Tone is normal.  NEUROLOGIC: Patient is awake, alert and oriented to name, place and time. Casual gait is normal.  SKIN: no bruising or bleeding and no rashes  PSYCHIATRIC: Behavior:  Appropriate  Mood:  appropriate    DATA:  Pelvic US 10/3/23  FINDINGS:  UTERUS:   Enlarged lobulated fibroid uterus measures 14.8 x 10.1 x 9.9 cm. ENDOMETRIUM: Endometrial thickness 9.8 mm. No fluid or mass in the endometrial canal.    MYOMETRIUM: Largest uterine leiomyomas measured:  Left posterior uterine leiomyoma measuring 6.3 x 4.2 x 4.6 cm. Left anterior subserosal leiomyoma measures 3.3 x 3.2 x 2.9 cm. Right-sided posterior leiomyoma measures 8.3 x 5.0 x 5.5 cm     OVARIES AND ADNEXA:     RIGHT:   Measures 2.9 x 3 x 2 x 2.9 cm. Normal appearance. LEFT:   Measures 2.3 x 2.1 x 2.0 cm. Normal appearance with no masses.        CUL-DE-SAC:   Trace amount of free fluid the cul-de-sac of the pelvis  OTHER: Negative. Bladder appears normal.         Impression   CONCLUSION:  1. Enlarged lobulated fibroid uterus. Three dominant leiomyomas measured. 2. Normal thickness endometrium measures 9.8 mm.  3. Normal bilateral ovaries. Small amount of free pelvic fluid presumed physiologic. ASSESSMENT AND PLAN:  1. Fibroids  - reviewed US findings. Large fibroids, unclear if they are near tubal ostia. - if wasn't planning pregnancy, would discuss surgery, however at this time with her age, could try for pregnancy first.    2. Encounter for preconception consultation  - discussed impact of fibroids and age on pregnancy. - recommend FemVue to assess for tubal patency. If not patent, consider myomectomy vs acessa. If patent, proceed with trying for pregnancy. Ok to try x 6 months, if no success, will refer to fertility. - we discussed ovulation tracking, optimal time for intercourse. follow up 2-3 weeks for FemVue  Total face to face time was 30 min, more than 50% of the time was spent in counseling and/or coordination of care related to fibroids and pregnancy planning.   Karen Harley, DO

## 2023-10-20 ENCOUNTER — TELEPHONE (OUTPATIENT)
Dept: FAMILY MEDICINE CLINIC | Facility: CLINIC | Age: 43
End: 2023-10-20

## 2023-10-20 ENCOUNTER — TELEPHONE (OUTPATIENT)
Dept: OBGYN CLINIC | Facility: CLINIC | Age: 43
End: 2023-10-20

## 2023-10-20 NOTE — TELEPHONE ENCOUNTER
[facility] to make sure it is covered under their insurance. They would like the office to reach out to the patient with the procedure code.

## 2023-10-20 NOTE — TELEPHONE ENCOUNTER
Spoke to [employer] from [employer] and the [employer] from [employer]. They explained that when a medication is written as JAIMEE, that will not be covered but they automatically substitute for L-Thyroxine Synthroid which will be covered. Rn approved change as patient will be received Synthroid which is what script is for. No further questions/concerns.

## 2023-12-13 ENCOUNTER — HOSPITAL ENCOUNTER (OUTPATIENT)
Dept: MAMMOGRAPHY | Age: 43
Discharge: HOME | End: 2023-12-13
Attending: FAMILY MEDICINE
Payer: COMMERCIAL

## 2023-12-13 DIAGNOSIS — Z12.31 BREAST CANCER SCREENING BY MAMMOGRAM: ICD-10-CM

## 2023-12-13 PROCEDURE — 77063 BREAST TOMOSYNTHESIS BI: CPT | Performed by: FAMILY MEDICINE

## 2023-12-13 PROCEDURE — 77067 SCR MAMMO BI INCL CAD: CPT | Performed by: FAMILY MEDICINE

## 2024-03-04 ENCOUNTER — NURSE ONLY (OUTPATIENT)
Dept: FAMILY MEDICINE CLINIC | Facility: CLINIC | Age: 44
End: 2024-03-04
Payer: COMMERCIAL

## 2024-03-04 ENCOUNTER — APPOINTMENT (OUTPATIENT)
Dept: URGENT CARE | Age: 44
End: 2024-03-04

## 2024-03-04 ENCOUNTER — TELEPHONE (OUTPATIENT)
Dept: URGENT CARE | Age: 44
End: 2024-03-04

## 2024-03-04 DIAGNOSIS — Z23 NEED FOR VACCINATION: Primary | ICD-10-CM

## 2024-03-05 ENCOUNTER — APPOINTMENT (OUTPATIENT)
Dept: LAB | Age: 44
End: 2024-03-05

## 2024-03-06 ENCOUNTER — PATIENT MESSAGE (OUTPATIENT)
Dept: FAMILY MEDICINE CLINIC | Facility: CLINIC | Age: 44
End: 2024-03-06

## 2024-04-03 NOTE — TELEPHONE ENCOUNTER
Please review; protocol failed/ has no protocol    No active /future labs noted   Message sent for patient to make an appointment.     Requested Prescriptions   Pending Prescriptions Disp Refills    LEVOTHYROXINE 50 MCG Oral Tab [Pharmacy Med Name: L-THYROXINE (SYNTHROID) TABS 50MCG] 90 tablet 3     Sig: TAKE 1 TABLET BEFORE BREAKFAST       Thyroid Medication Protocol Failed - 3/31/2024 11:45 PM        Failed - TSH in past 12 months        Passed - Last TSH value is normal     Lab Results   Component Value Date    TSH 1.640 03/08/2023                 Passed - In person appointment or virtual visit in the past 12 mos or appointment in next 3 mos     Recent Outpatient Visits              1 month ago Need for vaccination    [facility]    Nurse Only    5 months ago Fibroids    [facility] - OB/GYN Sharri Leach DO    Office Visit    9 months ago Essential hypertension    [facility] Fadia Sheldon MD    Office Visit    1 year ago Encounter for gynecological examination without abnormal finding    [facility] Fadia Sheldon MD    Office Visit    1 year ago Subacute cough    [facility] Fadia Sheldon MD    Telemedicine                         Recent Outpatient Visits              1 month ago Need for vaccination    [facility]    Nurse Only    5 months ago Fibroids    [facility] - OB/GYN Sharri Leach DO    Office Visit    9 months ago Essential hypertension    [facility] Fadia Sheldon MD    Office Visit    1 year ago Encounter for gynecological examination without abnormal finding    [facility] Fadia Sheldon MD    Office Visit    1 year ago  Subacute cough    [facility], [facility]    Telemedicine

## 2024-04-04 RX ORDER — LEVOTHYROXINE SODIUM 0.05 MG/1
50 TABLET ORAL
Qty: 90 TABLET | Refills: 0 | OUTPATIENT
Start: 2024-04-04

## 2024-04-04 NOTE — TELEPHONE ENCOUNTER
Refusing levothyroxine: patient read MyChart and 2 message left for patient to call. Per Dr. Sheldon she will do refills to get patient to appointment once made if needed.

## 2024-04-04 NOTE — TELEPHONE ENCOUNTER
2nd attempt/left voice mail message for pt to call back. Please, see the message below when the call is returned. Pt also read her [employer] message on 4-3-24 per the below.

## 2024-04-05 NOTE — TELEPHONE ENCOUNTER
No soon openings, ok to schedule in SDS slot.  Please advise.      Pt states she has plenty of medication and does not need refill asap.

## 2024-04-08 NOTE — TELEPHONE ENCOUNTER
Spoke to patient; she will need medication in the next month.  She declined to schedule an appt.  She said she will call back when she has her upcoming work schedule.

## 2024-04-30 ENCOUNTER — OFFICE VISIT (OUTPATIENT)
Dept: FAMILY MEDICINE CLINIC | Facility: CLINIC | Age: 44
End: 2024-04-30
Payer: COMMERCIAL

## 2024-04-30 VITALS
DIASTOLIC BLOOD PRESSURE: 80 MMHG | BODY MASS INDEX: 27 KG/M2 | WEIGHT: 131 LBS | HEART RATE: 63 BPM | SYSTOLIC BLOOD PRESSURE: 132 MMHG

## 2024-04-30 DIAGNOSIS — E03.9 HYPOTHYROIDISM (ACQUIRED): ICD-10-CM

## 2024-04-30 DIAGNOSIS — I10 ESSENTIAL HYPERTENSION: Primary | ICD-10-CM

## 2024-04-30 LAB
ANION GAP SERPL CALC-SCNC: 3 MMOL/L (ref 0–18)
BUN BLD-MCNC: 14 MG/DL (ref 9–23)
BUN/CREAT SERPL: 17.1 (ref 10–20)
CALCIUM BLD-MCNC: 9.4 MG/DL (ref 8.7–10.4)
CHLORIDE SERPL-SCNC: 110 MMOL/L (ref 98–112)
CHOLEST SERPL-MCNC: 148 MG/DL (ref ?–200)
CO2 SERPL-SCNC: 27 MMOL/L (ref 21–32)
CREAT BLD-MCNC: 0.82 MG/DL
DEPRECATED RDW RBC AUTO: 42.9 FL (ref 35.1–46.3)
EGFRCR SERPLBLD CKD-EPI 2021: 91 ML/MIN/1.73M2 (ref 60–?)
ERYTHROCYTE [DISTWIDTH] IN BLOOD BY AUTOMATED COUNT: 12.5 % (ref 11–15)
FASTING PATIENT LIPID ANSWER: NO
FASTING STATUS PATIENT QL REPORTED: NO
GLUCOSE BLD-MCNC: 78 MG/DL (ref 70–99)
HCT VFR BLD AUTO: 40.8 %
HDLC SERPL-MCNC: 72 MG/DL (ref 40–59)
HGB BLD-MCNC: 13.8 G/DL
LDLC SERPL CALC-MCNC: 63 MG/DL (ref ?–100)
MCH RBC QN AUTO: 31.3 PG (ref 26–34)
MCHC RBC AUTO-ENTMCNC: 33.8 G/DL (ref 31–37)
MCV RBC AUTO: 92.5 FL
NONHDLC SERPL-MCNC: 76 MG/DL (ref ?–130)
OSMOLALITY SERPL CALC.SUM OF ELEC: 289 MOSM/KG (ref 275–295)
PLATELET # BLD AUTO: 373 10(3)UL (ref 150–450)
POTASSIUM SERPL-SCNC: 3.9 MMOL/L (ref 3.5–5.1)
RBC # BLD AUTO: 4.41 X10(6)UL
SODIUM SERPL-SCNC: 140 MMOL/L (ref 136–145)
TRIGL SERPL-MCNC: 64 MG/DL (ref 30–149)
TSI SER-ACNC: 1.72 MIU/ML (ref 0.55–4.78)
VLDLC SERPL CALC-MCNC: 9 MG/DL (ref 0–30)
WBC # BLD AUTO: 6.3 X10(3) UL (ref 4–11)

## 2024-04-30 PROCEDURE — 3075F SYST BP GE 130 - 139MM HG: CPT | Performed by: FAMILY MEDICINE

## 2024-04-30 PROCEDURE — 3079F DIAST BP 80-89 MM HG: CPT | Performed by: FAMILY MEDICINE

## 2024-04-30 PROCEDURE — 96127 BRIEF EMOTIONAL/BEHAV ASSMT: CPT | Performed by: FAMILY MEDICINE

## 2024-04-30 PROCEDURE — 99214 OFFICE O/P EST MOD 30 MIN: CPT | Performed by: FAMILY MEDICINE

## 2024-04-30 RX ORDER — LEVOTHYROXINE SODIUM 0.05 MG/1
50 TABLET ORAL
Qty: 90 TABLET | Refills: 3 | Status: SHIPPED | OUTPATIENT
Start: 2024-04-30

## 2024-04-30 RX ORDER — LEVOTHYROXINE SODIUM 50 MCG
50 TABLET ORAL
Qty: 90 TABLET | Refills: 1 | Status: CANCELLED | OUTPATIENT
Start: 2024-04-30

## 2024-04-30 RX ORDER — METOPROLOL SUCCINATE 25 MG/1
25 TABLET, EXTENDED RELEASE ORAL DAILY
Qty: 90 TABLET | Refills: 3 | Status: SHIPPED | OUTPATIENT
Start: 2024-04-30

## 2024-04-30 NOTE — PROGRESS NOTES
Subjective:   Patient ID: Pawel Duque is a 43 year old female.    Patient presents to follow-up on hypertension and hypothyroidism.  Taking medication consistently.    Thyroid Problem  The current episode started more than 1 year ago. The problem has been unchanged. Associated symptoms include fatigue. Pertinent negatives include no chest pain.   Hypertension  This is a chronic problem. The current episode started more than 1 year ago. The problem is controlled. Pertinent negatives include no chest pain. There are no associated agents to hypertension. There is no history of CAD/MI. Identifiable causes of hypertension include a thyroid problem. There is no history of chronic renal disease.       History/Other:   Review of Systems   Constitutional:  Positive for fatigue.   Cardiovascular:  Negative for chest pain.     Current Outpatient Medications   Medication Sig Dispense Refill    metoprolol succinate ER 25 MG Oral Tablet 24 Hr Take 1 tablet (25 mg total) by mouth daily. 90 tablet 3    SYNTHROID 50 MCG Oral Tab Take 1 tablet (50 mcg total) by mouth before breakfast. 90 tablet 1    SUMAtriptan Succinate 50 MG Oral Tab Take 1 tablet (50 mg total) by mouth every 2 (two) hours as needed for Migraine.  0     Allergies:No Known Allergies    Objective:   Physical Exam  Constitutional:       Appearance: Normal appearance.   Cardiovascular:      Rate and Rhythm: Normal rate and regular rhythm.      Pulses: Normal pulses.      Heart sounds: Normal heart sounds.   Pulmonary:      Effort: Pulmonary effort is normal.      Breath sounds: Normal breath sounds.   Musculoskeletal:      Right lower leg: No edema.      Left lower leg: No edema.   Neurological:      Mental Status: She is alert.         Assessment & Plan:   1. Essential hypertension-blood pressure controlled with metoprolol.  Continue current medication.  Recommend follow-up for routine physical with Pap smear in 2 to 3 months.  Labs done today     2.  Hypothyroidism (acquired)-taking thyroid medication consistently.  She is feeling some fatigue, occasionally hot and occasionally cold.  She and her  are still considering conception but she has not been doing ovulation monitoring.  Check labs as ordered.  Will refill with levothyroxine if TSH normal.       Orders Placed This Encounter   Procedures    TSH [E]    Basic Metabolic Panel (8) [E]    Lipid Panel [E]    CBC, Platelet; No Differential       Meds This Visit:  Requested Prescriptions     Signed Prescriptions Disp Refills    metoprolol succinate ER 25 MG Oral Tablet 24 Hr 90 tablet 3     Sig: Take 1 tablet (25 mg total) by mouth daily.       Imaging & Referrals:  None

## 2024-06-28 ENCOUNTER — OFFICE VISIT (OUTPATIENT)
Dept: FAMILY MEDICINE CLINIC | Facility: CLINIC | Age: 44
End: 2024-06-28

## 2024-06-28 ENCOUNTER — TELEPHONE (OUTPATIENT)
Dept: FAMILY MEDICINE CLINIC | Facility: CLINIC | Age: 44
End: 2024-06-28

## 2024-06-28 ENCOUNTER — HOSPITAL ENCOUNTER (OUTPATIENT)
Dept: GENERAL RADIOLOGY | Age: 44
Discharge: HOME | End: 2024-06-28
Attending: FAMILY MEDICINE
Payer: COMMERCIAL

## 2024-06-28 VITALS
BODY MASS INDEX: 27.21 KG/M2 | OXYGEN SATURATION: 95 % | TEMPERATURE: 98 F | WEIGHT: 135 LBS | SYSTOLIC BLOOD PRESSURE: 145 MMHG | DIASTOLIC BLOOD PRESSURE: 83 MMHG | HEART RATE: 69 BPM | HEIGHT: 59.06 IN

## 2024-06-28 DIAGNOSIS — M25.512 LEFT SHOULDER PAIN, UNSPECIFIED CHRONICITY: ICD-10-CM

## 2024-06-28 DIAGNOSIS — M25.512 LEFT SHOULDER PAIN, UNSPECIFIED CHRONICITY: Primary | ICD-10-CM

## 2024-06-28 PROCEDURE — 3008F BODY MASS INDEX DOCD: CPT | Performed by: FAMILY MEDICINE

## 2024-06-28 PROCEDURE — 3077F SYST BP >= 140 MM HG: CPT | Performed by: FAMILY MEDICINE

## 2024-06-28 PROCEDURE — 73030 X-RAY EXAM OF SHOULDER: CPT | Performed by: FAMILY MEDICINE

## 2024-06-28 PROCEDURE — 99214 OFFICE O/P EST MOD 30 MIN: CPT | Performed by: FAMILY MEDICINE

## 2024-06-28 PROCEDURE — 3079F DIAST BP 80-89 MM HG: CPT | Performed by: FAMILY MEDICINE

## 2024-06-28 RX ORDER — MELOXICAM 7.5 MG/1
7.5 TABLET ORAL DAILY
Qty: 90 TABLET | Refills: 0 | Status: SHIPPED | OUTPATIENT
Start: 2024-06-28

## 2024-06-28 NOTE — PROGRESS NOTES
Subjective:   Patient ID: Pawel Duque is a 43 year old female.    Shoulder Pain   The pain is present in the left shoulder. This is a new problem. The current episode started more than 1 month ago. The problem occurs daily. The problem has been gradually worsening. The quality of the pain is described as aching and sharp. Associated symptoms include a limited range of motion. The symptoms are aggravated by activity. There is no history of osteoarthritis.       History/Other:   Review of Systems  Current Outpatient Medications   Medication Sig Dispense Refill    Meloxicam 7.5 MG Oral Tab Take 1 tablet (7.5 mg total) by mouth daily. 90 tablet 0    metoprolol succinate ER 25 MG Oral Tablet 24 Hr Take 1 tablet (25 mg total) by mouth daily. 90 tablet 3    levothyroxine 50 MCG Oral Tab Take 1 tablet (50 mcg total) by mouth before breakfast. 90 tablet 3    SUMAtriptan Succinate 50 MG Oral Tab Take 1 tablet (50 mg total) by mouth every 2 (two) hours as needed for Migraine.  0     Allergies:No Known Allergies    Objective:   Physical Exam  Constitutional:       Appearance: Normal appearance.   Pulmonary:      Effort: Pulmonary effort is normal.   Musculoskeletal:      Comments: Cervical spine with full range of motion.  Left shoulder without deformity.  No focal tenderness.  Decreased active abduction, but able to passively abduct without pain.  Decreased internal and external rotation with pain.  Distal NVI.   Neurological:      Mental Status: She is alert.         Assessment & Plan:   1. Left shoulder pain, unspecified chronicity    Patient presents with left shoulder pain and decreased range of motion which has been increasing over the past 3 months.  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  No pain at night, it does not hurt to lay on that side.  Exam consistent with rotator cuff tendinopathy.  Plan to check  x-ray.  Recommend avoiding painful activities.  Meloxicam 7.5-15 mg daily reviewed instructions and side effects.  Referred for physical therapy.  Work restrictions with no lifting greater than 5 pounds, no overhead work, no heavy pushing or pulling.  Reevaluation in 3 weeks.    No orders of the defined types were placed in this encounter.      Meds This Visit:  Requested Prescriptions     Signed Prescriptions Disp Refills    Meloxicam 7.5 MG Oral Tab 90 tablet 0     Sig: Take 1 tablet (7.5 mg total) by mouth daily.       Imaging & Referrals:  PHYSICAL THERAPY - INTERNAL  XR SHOULDER, COMPLETE (MIN 2 VIEWS), LEFT (CPT=73030)

## 2024-06-28 NOTE — TELEPHONE ENCOUNTER
Patient calling, had gone down to get xray after seeing Dr Sheldon. Xray told her that they had not received the order yet.     Our records show that Dr. Sheldon signed and sent the order at 9:37 am    Can we resubmit the orders to Xray?    Fax [phone]   [phone]

## 2024-06-28 NOTE — TELEPHONE ENCOUNTER
[Xray completed]    I called patient, she confirmed Xray order was received and did have the Xray performed. She is aware Dr. Sheldon will review results and provide her interpretation and recommendations -- she was made aware results are released to patients at same time as providers and we will contact her with result notes. Patient verbalized understanding. No further questions or concerns at this time.

## 2024-07-03 ENCOUNTER — PATIENT MESSAGE (OUTPATIENT)
Dept: FAMILY MEDICINE CLINIC | Facility: CLINIC | Age: 44
End: 2024-07-03

## 2024-07-03 NOTE — TELEPHONE ENCOUNTER
From: Pawel Duque  To: Fadia Sheldon  Sent: 7/3/2024 8:59 AM CDT  Subject: HCP's Release To Work    Hi Doc, My employer requires me to submit the attached HCP Release to Work form within 3 business days from July 1 for a work restriction request. Thank you.

## 2024-07-03 NOTE — TELEPHONE ENCOUNTER
Dr. Sheldon, please see attachment and advise if you can fill out the form or if it should be sent to the forms department. Thanks.    Future Appointments   Date Time Provider Department Center   7/18/2024 11:45 AM Fadia Sheldon MD [facility]   8/29/2024  9:30 AM Fadia Sheldon MD [facility]

## 2024-07-06 NOTE — TELEPHONE ENCOUNTER
Onsite Clinical - please see patient's 2 MyChart Messages.     Please assist with forms.       Dr Sheldon - see patient's MyChart Message     Employer is requesting additional information

## 2024-07-08 NOTE — TELEPHONE ENCOUNTER
Please work with Deneen to find the form that I completed.  There were a lot of specifics on the form and also in the letter I completed.

## 2024-07-08 NOTE — TELEPHONE ENCOUNTER
Called and spoke to patient. Original form was faxed and received at patients employer. The form was sent to scanning and in order to compare the new form to the old form we need to contact patients employer to have them fax over original form. Patient provided the number [phone] c/o chris More . I attempted to call but office closes at 4:30 pm. I will call them again tomorrow so they may fax over the original form. The second form is in my bin.

## 2024-07-09 NOTE — TELEPHONE ENCOUNTER
Called [phone] and spoke to nazario handy associate for [employer] Human resource department, she stated she could not find the patient with that birthday and asked for her social number. I stated I did not have that information and she stated she wanted the patient to call them back and provide information that they needed from the patient in order to refax original form to us. Pt was contacted and she will call HR dept and our fax number provided to her. The second form is in my bin In the hold folder.

## 2024-07-09 NOTE — TELEPHONE ENCOUNTER
Patient returned call states she spoke to Human resources Su More and they will electronically sent the form back to patient and what they are looking for is 1. Diagnosis and rationale 2. Specific objective and measurable linitations or restrictions , note has sedentary work listed , how many hours does the patient need to sit and stand and walk per day. 3. Length of time that restriction are to be in effect ( provider must reevaluate temporary restrictions every 6-8 weeks. 4. Date of next office visit for reevaluation. Faxed to [phone] attn Su More.

## 2024-07-10 NOTE — TELEPHONE ENCOUNTER
Patient saw that she had a missed call and called our office back. Patient was informed that her mailbox is full  Upon Chart review, noted that a [employer] message was sent to patient. Message read to patient and answers were given per patient:    Hours per day can sit: 12 hours  Stand: 12 hours  Walkin hours  *for standing and walking-does not have restrictions; unsure how to answer appropriately as work day is a full 12 hour shift    Dominant Hand: Left  Writes with Right hand    4. Yes, that is correct    ASHTYN Vines37 minutes ago (2:32 PM)     DEREK Armas,     Just tried to call you!     Wanted to review the information that you need added to this form.       How many hours per day do you feel like you can sit, stand walk?  Which is your dominant hand?     Left shoulder pain and decreased range of motion consistent with rotator cuff tendinopathy is why you require work accommodations, is that correct?     Just want to answer the questions in the way you understand they are requesting.       Take care,  ASHTYN Garcia

## 2024-07-18 ENCOUNTER — OFFICE VISIT (OUTPATIENT)
Dept: FAMILY MEDICINE CLINIC | Facility: CLINIC | Age: 44
End: 2024-07-18

## 2024-07-18 VITALS
OXYGEN SATURATION: 98 % | SYSTOLIC BLOOD PRESSURE: 116 MMHG | BODY MASS INDEX: 27.01 KG/M2 | HEART RATE: 73 BPM | WEIGHT: 134 LBS | HEIGHT: 59.06 IN | TEMPERATURE: 97 F | DIASTOLIC BLOOD PRESSURE: 78 MMHG

## 2024-07-18 DIAGNOSIS — M25.512 LEFT SHOULDER PAIN, UNSPECIFIED CHRONICITY: Primary | ICD-10-CM

## 2024-07-18 PROCEDURE — 3074F SYST BP LT 130 MM HG: CPT | Performed by: FAMILY MEDICINE

## 2024-07-18 PROCEDURE — 3078F DIAST BP <80 MM HG: CPT | Performed by: FAMILY MEDICINE

## 2024-07-18 PROCEDURE — 99213 OFFICE O/P EST LOW 20 MIN: CPT | Performed by: FAMILY MEDICINE

## 2024-07-18 PROCEDURE — 3008F BODY MASS INDEX DOCD: CPT | Performed by: FAMILY MEDICINE

## 2024-07-18 NOTE — PROGRESS NOTES
Subjective:   Patient ID: Pawel Duque is a 43 year old female.    Shoulder Pain   The pain is present in the left shoulder. The current episode started more than 1 month ago. The problem occurs daily. The problem has been unchanged. The quality of the pain is described as aching. Associated symptoms include a limited range of motion. The symptoms are aggravated by activity.       History/Other:   Review of Systems  Current Outpatient Medications   Medication Sig Dispense Refill    Meloxicam 7.5 MG Oral Tab Take 1 tablet (7.5 mg total) by mouth daily. 90 tablet 0    metoprolol succinate ER 25 MG Oral Tablet 24 Hr Take 1 tablet (25 mg total) by mouth daily. 90 tablet 3    levothyroxine 50 MCG Oral Tab Take 1 tablet (50 mcg total) by mouth before breakfast. 90 tablet 3    SUMAtriptan Succinate 50 MG Oral Tab Take 1 tablet (50 mg total) by mouth every 2 (two) hours as needed for Migraine.  0     Allergies:No Known Allergies    Objective:   Physical Exam  Constitutional:       Appearance: Normal appearance.   Cardiovascular:      Rate and Rhythm: Normal rate and regular rhythm.   Musculoskeletal:      Comments: Left shoulder without deformity, no focal tenderness.  Active abduction limited to 30 degrees, passive range of motion normal.  Pain with external rotation.  Positive Neer's impingement.  Distal neurovascular intact   Neurological:      Mental Status: She is alert.         Assessment & Plan:   1. Left shoulder pain, unspecified chronicity    Patient returns for follow-up on left shoulder pain suspicious for rotator cuff  tendinitis/Impingement.  She has been taking meloxicam 7.5 mg daily, she has had 2 sessions of physical therapy, third session scheduled for later today.  Overall no significant change, she continues to have pain with lifting left shoulder.  Recommend increase meloxicam to 15 mg daily.  Continue physical therapy.  Continue current work restrictions.  Follow-up here in 3 weeks.  Letter for  work and form done today.  No orders of the defined types were placed in this encounter.      Meds This Visit:  Requested Prescriptions      No prescriptions requested or ordered in this encounter       Imaging & Referrals:  None

## 2024-08-06 ENCOUNTER — OFFICE VISIT (OUTPATIENT)
Dept: FAMILY MEDICINE CLINIC | Facility: CLINIC | Age: 44
End: 2024-08-06

## 2024-08-06 VITALS
TEMPERATURE: 98 F | HEIGHT: 59.06 IN | SYSTOLIC BLOOD PRESSURE: 122 MMHG | DIASTOLIC BLOOD PRESSURE: 81 MMHG | WEIGHT: 137 LBS | BODY MASS INDEX: 27.62 KG/M2 | HEART RATE: 66 BPM | OXYGEN SATURATION: 96 %

## 2024-08-06 DIAGNOSIS — G89.29 CHRONIC LEFT SHOULDER PAIN: Primary | ICD-10-CM

## 2024-08-06 DIAGNOSIS — M25.512 CHRONIC LEFT SHOULDER PAIN: Primary | ICD-10-CM

## 2024-08-06 PROCEDURE — 3008F BODY MASS INDEX DOCD: CPT | Performed by: FAMILY MEDICINE

## 2024-08-06 PROCEDURE — 99214 OFFICE O/P EST MOD 30 MIN: CPT | Performed by: FAMILY MEDICINE

## 2024-08-06 PROCEDURE — 3074F SYST BP LT 130 MM HG: CPT | Performed by: FAMILY MEDICINE

## 2024-08-06 PROCEDURE — 3079F DIAST BP 80-89 MM HG: CPT | Performed by: FAMILY MEDICINE

## 2024-08-06 NOTE — PROGRESS NOTES
Subjective:   Patient ID: Pawel Duque is a 43 year old female.    Shoulder Pain   The pain is present in the left shoulder. This is a chronic problem. The current episode started more than 1 month ago. The problem occurs daily. The problem has been gradually worsening. The quality of the pain is described as aching. The symptoms are aggravated by activity.       History/Other:   Review of Systems  Current Outpatient Medications   Medication Sig Dispense Refill    Meloxicam 7.5 MG Oral Tab Take 1 tablet (7.5 mg total) by mouth daily. 90 tablet 0    metoprolol succinate ER 25 MG Oral Tablet 24 Hr Take 1 tablet (25 mg total) by mouth daily. 90 tablet 3    levothyroxine 50 MCG Oral Tab Take 1 tablet (50 mcg total) by mouth before breakfast. 90 tablet 3    SUMAtriptan Succinate 50 MG Oral Tab Take 1 tablet (50 mg total) by mouth every 2 (two) hours as needed for Migraine.  0     Allergies:No Known Allergies    Objective:   Physical Exam  Constitutional:       Appearance: Normal appearance.   Pulmonary:      Effort: Pulmonary effort is normal.   Musculoskeletal:      Comments: Left shoulder without deformity.  No focal tenderness.  Abduction actively admitted to 70 degrees, passive limited to 100 degrees.  Mildly decreased internal and external rotation.   Neurological:      Mental Status: She is alert.         Assessment & Plan:   1. Chronic left shoulder pain    Patient presents for follow-up on left shoulder pain.  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  A lot of lifting, pushing and pulling.  Since last visit she has continued in physical therapy, continued with work restrictions and meloxicam.  However pain has not improved in fact she is noticing some worsening with pain over superior and proximal shoulder aching especially in the evening.  In addition, noticing decreased range of motion.  Rule out adhesive capsulitis versus deltoid strain versus rotator cuff strain.  Recommend MRI shoulder and  orthopedic evaluation.  Work forms done today.  Restrictions should stay in place until she is seen by orthopedics, if she cannot see them within 3 weeks she will see me for follow-up in 3 weeks.    No orders of the defined types were placed in this encounter.      Meds This Visit:  Requested Prescriptions      No prescriptions requested or ordered in this encounter       Imaging & Referrals:  ORTHOPEDIC - EXTERNAL  MRI SHOULDER (W+WO), LEFT (CPT=73223)

## 2024-08-07 ENCOUNTER — PATIENT MESSAGE (OUTPATIENT)
Dept: FAMILY MEDICINE CLINIC | Facility: CLINIC | Age: 44
End: 2024-08-07

## 2024-08-07 NOTE — TELEPHONE ENCOUNTER
From: Pawel Duque  To: Fadia Sheldon  Sent: 8/7/2024 9:41 AM CDT  Subject: MRI Referral     May I request that the referral request for an MRI be updated to an MRI location [place] [address] area and the authorization from the insurance? Thank you.

## 2024-08-08 NOTE — TELEPHONE ENCOUNTER
Order for MRI of left shoulder sent via Right Fax to #[phone].   Confirmation received that fax went through.  [employer] message sent to patient to inform.

## 2024-08-29 ENCOUNTER — LAB ENCOUNTER (OUTPATIENT)
Dept: LAB | Age: 44
End: 2024-08-29
Attending: FAMILY MEDICINE
Payer: COMMERCIAL

## 2024-08-29 ENCOUNTER — OFFICE VISIT (OUTPATIENT)
Dept: FAMILY MEDICINE CLINIC | Facility: CLINIC | Age: 44
End: 2024-08-29
Payer: COMMERCIAL

## 2024-08-29 VITALS
HEART RATE: 67 BPM | BODY MASS INDEX: 28.22 KG/M2 | TEMPERATURE: 98 F | DIASTOLIC BLOOD PRESSURE: 85 MMHG | SYSTOLIC BLOOD PRESSURE: 129 MMHG | HEIGHT: 59 IN | OXYGEN SATURATION: 97 % | WEIGHT: 140 LBS

## 2024-08-29 DIAGNOSIS — E03.9 HYPOTHYROIDISM, ACQUIRED: ICD-10-CM

## 2024-08-29 DIAGNOSIS — M25.512 LEFT SHOULDER PAIN, UNSPECIFIED CHRONICITY: ICD-10-CM

## 2024-08-29 DIAGNOSIS — Z12.31 ENCOUNTER FOR SCREENING MAMMOGRAM FOR BREAST CANCER: ICD-10-CM

## 2024-08-29 DIAGNOSIS — Z01.419 ENCOUNTER FOR GYNECOLOGICAL EXAMINATION WITHOUT ABNORMAL FINDING: Primary | ICD-10-CM

## 2024-08-29 DIAGNOSIS — D25.9 UTERINE LEIOMYOMA, UNSPECIFIED LOCATION: ICD-10-CM

## 2024-08-29 DIAGNOSIS — I10 PRIMARY HYPERTENSION: ICD-10-CM

## 2024-08-29 LAB — TSI SER-ACNC: 1.57 MIU/ML (ref 0.55–4.78)

## 2024-08-29 PROCEDURE — 3079F DIAST BP 80-89 MM HG: CPT | Performed by: FAMILY MEDICINE

## 2024-08-29 PROCEDURE — 84443 ASSAY THYROID STIM HORMONE: CPT

## 2024-08-29 PROCEDURE — 3008F BODY MASS INDEX DOCD: CPT | Performed by: FAMILY MEDICINE

## 2024-08-29 PROCEDURE — 3074F SYST BP LT 130 MM HG: CPT | Performed by: FAMILY MEDICINE

## 2024-08-29 PROCEDURE — 99396 PREV VISIT EST AGE 40-64: CPT | Performed by: FAMILY MEDICINE

## 2024-08-29 PROCEDURE — 36415 COLL VENOUS BLD VENIPUNCTURE: CPT

## 2024-08-29 RX ORDER — SUMATRIPTAN 50 MG/1
50 TABLET, FILM COATED ORAL EVERY 2 HOUR PRN
Qty: 9 TABLET | Refills: 1 | Status: SHIPPED | OUTPATIENT
Start: 2024-08-29

## 2024-08-29 NOTE — TELEPHONE ENCOUNTER
Current Outpatient Medications:     SUMAtriptan 50 MG Oral Tab, Take 1 tablet (50 mg total) by mouth every 2 (two) hours as needed for Migraine., Disp: 9 tablet, Rfl: 1

## 2024-08-29 NOTE — PROGRESS NOTES
Subjective:   Patient ID: Pawel Duque is a 43 year old female.    Patient presents for routine routine Pap and physical.  Last Pap smear no endocervical cells, ASCUS, HPV negative.        History/Other:   Review of Systems  Current Outpatient Medications   Medication Sig Dispense Refill    SUMAtriptan 50 MG Oral Tab Take 1 tablet (50 mg total) by mouth every 2 (two) hours as needed for Migraine. 9 tablet 1    Meloxicam 7.5 MG Oral Tab Take 1 tablet (7.5 mg total) by mouth daily. 90 tablet 0    metoprolol succinate ER 25 MG Oral Tablet 24 Hr Take 1 tablet (25 mg total) by mouth daily. 90 tablet 3    levothyroxine 50 MCG Oral Tab Take 1 tablet (50 mcg total) by mouth before breakfast. 90 tablet 3     Allergies:No Known Allergies    Objective:   Physical Exam  Constitutional:       Appearance: Normal appearance.   Cardiovascular:      Rate and Rhythm: Normal rate and regular rhythm.      Heart sounds: Normal heart sounds.   Pulmonary:      Effort: Pulmonary effort is normal.      Breath sounds: Normal breath sounds.   Chest:   Breasts:     Right: Normal. No mass.      Left: Normal. No mass.   Abdominal:      Palpations: Abdomen is soft. There is no mass.      Tenderness: There is no abdominal tenderness.   Genitourinary:     Comments: External genitalia normal  Vagina normal  Cervix normal   uterus 12 cm.  Nontender.  Lymphadenopathy:      Cervical: No cervical adenopathy.      Upper Body:      Right upper body: No axillary adenopathy.      Left upper body: No axillary adenopathy.   Skin:     General: Skin is warm and dry.   Neurological:      Mental Status: She is alert and oriented to person, place, and time.         Assessment & Plan:   1. Encounter for gynecological examination without abnormal finding-patient is , , menses regular moderate flow.  She is a hospital nurse  Exercising with walking  Reviewed recent labs.  Pap smear done today   2. Encounter for screening mammogram for breast  cancer-order given due in 12/2024   3. Primary hypertension-blood pressure controlled with metoprolol which is also used for migraine prophylaxis.   4. Hypothyroidism, acquired-has been experiencing increased fatigue, concerned levothyroxine dose may need to be adjusted.  Check TSH today   5. Uterine leiomyoma, unspecified location-saw Dr. Leach last year.  At that time she was considering conception.  She does not want to explore reproductive endocrinology.  She is having some pressure symptoms from fibroids.  Interested in less invasive treatment for fibroids such as uterine artery embolization if that was an appropriate option.  Referred back to GYN for reevaluation.   6. Left shoulder pain, unspecified chronicity-for 4 to 5 months.  Saw orthopedics, had MRI which shows partial-thickness rotator cuff tear.  He has follow-up later today.       Orders Placed This Encounter   Procedures    ThinPrep PAP with HPV Reflex Request [E]       Meds This Visit:  Requested Prescriptions     Signed Prescriptions Disp Refills    SUMAtriptan 50 MG Oral Tab 9 tablet 1     Sig: Take 1 tablet (50 mg total) by mouth every 2 (two) hours as needed for Migraine.       Imaging & Referrals:  [facility], OBG (INTERNAL)  [facility] RADHA 2D+3D SCREENING BILAT (CPT=77067/59392)

## 2024-08-31 RX ORDER — SUMATRIPTAN 50 MG/1
50 TABLET, FILM COATED ORAL EVERY 2 HOUR PRN
Qty: 9 TABLET | Refills: 1 | OUTPATIENT
Start: 2024-08-31

## 2024-08-31 NOTE — TELEPHONE ENCOUNTER
Duplicate request, previously addressed.      Disp Refills Start End    SUMAtriptan 50 MG Oral Tab 9 tablet 1 8/29/2024 --    Sig - Route: Take 1 tablet (50 mg total) by mouth every 2 (two) hours as needed for Migraine. - Oral    Sent to pharmacy as: SUMAtriptan Succinate 50 MG Oral Tablet (Imitrex)    E-Prescribing Status: Receipt confirmed by pharmacy (8/29/2024 10:02 AM CDT)      Pharmacy    [facility] - [address] [phone], [phone]

## 2024-09-04 LAB — HPV E6+E7 MRNA CVX QL NAA+PROBE: NEGATIVE

## 2024-11-07 ENCOUNTER — OFFICE VISIT (OUTPATIENT)
Dept: OBGYN CLINIC | Facility: CLINIC | Age: 44
End: 2024-11-07
Payer: COMMERCIAL

## 2024-11-07 ENCOUNTER — LAB ENCOUNTER (OUTPATIENT)
Dept: LAB | Facility: REFERENCE LAB | Age: 44
End: 2024-11-07
Attending: OBSTETRICS & GYNECOLOGY
Payer: COMMERCIAL

## 2024-11-07 VITALS
HEIGHT: 59 IN | SYSTOLIC BLOOD PRESSURE: 142 MMHG | DIASTOLIC BLOOD PRESSURE: 98 MMHG | WEIGHT: 143 LBS | BODY MASS INDEX: 28.83 KG/M2

## 2024-11-07 DIAGNOSIS — R53.83 OTHER FATIGUE: ICD-10-CM

## 2024-11-07 DIAGNOSIS — D21.9 FIBROIDS: Primary | ICD-10-CM

## 2024-11-07 LAB
DEPRECATED RDW RBC AUTO: 42.6 FL (ref 35.1–46.3)
ERYTHROCYTE [DISTWIDTH] IN BLOOD BY AUTOMATED COUNT: 12.3 % (ref 11–15)
HCT VFR BLD AUTO: 40.7 %
HGB BLD-MCNC: 13.3 G/DL
MCH RBC QN AUTO: 31.1 PG (ref 26–34)
MCHC RBC AUTO-ENTMCNC: 32.7 G/DL (ref 31–37)
MCV RBC AUTO: 95.1 FL
PLATELET # BLD AUTO: 322 10(3)UL (ref 150–450)
RBC # BLD AUTO: 4.28 X10(6)UL
WBC # BLD AUTO: 8.5 X10(3) UL (ref 4–11)

## 2024-11-07 PROCEDURE — 3080F DIAST BP >= 90 MM HG: CPT | Performed by: OBSTETRICS & GYNECOLOGY

## 2024-11-07 PROCEDURE — 3077F SYST BP >= 140 MM HG: CPT | Performed by: OBSTETRICS & GYNECOLOGY

## 2024-11-07 PROCEDURE — 3008F BODY MASS INDEX DOCD: CPT | Performed by: OBSTETRICS & GYNECOLOGY

## 2024-11-07 PROCEDURE — 99214 OFFICE O/P EST MOD 30 MIN: CPT | Performed by: OBSTETRICS & GYNECOLOGY

## 2024-11-07 PROCEDURE — 85027 COMPLETE CBC AUTOMATED: CPT | Performed by: OBSTETRICS & GYNECOLOGY

## 2024-11-07 NOTE — PROGRESS NOTES
RETURN GYN OFFICE VISIT  [facility]    CHIEF COMPLAINT:    Chief Complaint   Patient presents with    Follow - Up     fibroids       HISTORY OF PRESENT ILLNESS:    CLARA is a 43 year old female  here for   F/u for fibroids    Hasn't been preventing pregnancy - stopped OCPs last years.    Patient's last menstrual period was 10/12/2024 (exact date).  Monthly / regular, 7 days, flow is moderate, no cramps    Had a long / heavy one in August - 2 weeks, heavy the whole time. No pain with that    + sex with same partner  No bleeding or pain with sex    No change in voiding   No change in bowel movement    Does feel more bloated / pressure in pelvis.    Feels like is already pregnant - has gained so much weight.  Gets tired easily      REVIEW OF SYSTEMS:   CONSTITUTIONAL:  negative for fevers, chills, sweats and fatigue  GASTROINTESTINAL:  negative for nausea, vomiting, blood in stool, constipation, diarrhea and abdominal pain  GENITOURINARY: negative for no dysuria, urgency or frequency; no urinary incontinence; no hematuria  SKIN:  negative for  rash, skin lesion and pruritus  ENDOCRINE:  negative for acne, fatigue, weight gain and weight loss  BEHAVIOR/PSYCH:  negative for depressed mood, anhedonia and anxiety    CURRENT MEDICATIONS:      Current Outpatient Medications:     SUMAtriptan 50 MG Oral Tab, Take 1 tablet (50 mg total) by mouth and may repeat in 2 (two) hours as needed for migraine.  Maximum 2 tablets in 24 hours., Disp: 9 tablet, Rfl: 1    metoprolol succinate ER 25 MG Oral Tablet 24 Hr, Take 1 tablet (25 mg total) by mouth daily., Disp: 90 tablet, Rfl: 3    levothyroxine 50 MCG Oral Tab, Take 1 tablet (50 mcg total) by mouth before breakfast., Disp: 90 tablet, Rfl: 3    Metoprolol-HCTZ ER 25-12.5 MG Oral Tablet 24 Hr, , Disp: , Rfl:     Meloxicam 7.5 MG Oral Tab, Take 1 tablet (7.5 mg total) by mouth daily. (Patient not taking: Reported on 2024), Disp: 90 tablet, Rfl: 0    PAST MEDICAL,  SOCIAL AND FAMILY HISTORY:    Past Medical History:    Fibroids    HTN (hypertension)    Hypothyroid    Migraine     Past Surgical History:   Procedure Laterality Date    Breast biopsy Left     Kelsey localization wire 1 site left (cpt=19281)      Prior myomectomy       Family History   Problem Relation Age of Onset    Diabetes Mother         pre-diabetes    Other (prostate problems) Father     Heart Disease Sister     No Known Problems Sister     No Known Problems Brother      Social History     Socioeconomic History    Marital status:    Tobacco Use    Smoking status: Former     Current packs/day: 0.00     Types: Cigarettes     Quit date: 2015     Years since quittin.8    Smokeless tobacco: Never   Vaping Use    Vaping status: Never Used   Substance and Sexual Activity    Alcohol use: Yes     Comment: 1-2 times per week    Drug use: Not Currently    Sexual activity: Yes     Partners: Male           PHYSICAL EXAM:   Patient's last menstrual period was 10/12/2024 (exact date).; Body mass index is 28.88 kg/m².      CONSTITUTIONAL:  Awake, alert, cooperative, no apparent distress  EYES: sclera clear and conjunctiva normal  GASTROINTESTINAL:  soft, non-distended, non-tender, no masses palpated  Uterus palpable above umbilicus.  SKIN:  No rashes  PSYCH:  Affect Normal      ASSESSMENT AND PLAN:  1. Fibroids  - uterus palpates larger than last year, suspect either new fibroids or growth of existing fibroids, either way would recommend surgical management.  - Will get US.  - Then likely discuss abdominal myomectomy as it is the safest option as she wants to get pregnant soon.  - Pelvic US Complete GYNE Only [96603/14290]; Future    2. Other fatigue  - labs to assess for anemia.  - CBC, Platelet; No Differential; Future        Sharri Leach, DO

## 2024-11-13 ENCOUNTER — ULTRASOUND ENCOUNTER (OUTPATIENT)
Dept: OBGYN CLINIC | Facility: CLINIC | Age: 44
End: 2024-11-13
Payer: COMMERCIAL

## 2024-11-13 DIAGNOSIS — D21.9 FIBROIDS: ICD-10-CM

## 2024-12-16 ENCOUNTER — HOSPITAL ENCOUNTER (OUTPATIENT)
Dept: MAMMOGRAPHY | Age: 44
Discharge: HOME | End: 2024-12-16
Attending: FAMILY MEDICINE
Payer: COMMERCIAL

## 2024-12-16 DIAGNOSIS — Z12.31 ENCOUNTER FOR SCREENING MAMMOGRAM FOR BREAST CANCER: ICD-10-CM

## 2024-12-16 PROCEDURE — 77063 BREAST TOMOSYNTHESIS BI: CPT | Performed by: FAMILY MEDICINE

## 2024-12-16 PROCEDURE — 77067 SCR MAMMO BI INCL CAD: CPT | Performed by: FAMILY MEDICINE

## 2025-01-06 ENCOUNTER — TELEMEDICINE (OUTPATIENT)
Dept: OBGYN CLINIC | Facility: CLINIC | Age: 45
End: 2025-01-06
Payer: COMMERCIAL

## 2025-01-06 ENCOUNTER — TELEPHONE (OUTPATIENT)
Dept: OBGYN CLINIC | Facility: CLINIC | Age: 45
End: 2025-01-06

## 2025-01-06 DIAGNOSIS — D21.9 FIBROIDS: Primary | ICD-10-CM

## 2025-01-06 DIAGNOSIS — Z01.818 PREOPERATIVE TESTING: ICD-10-CM

## 2025-01-06 NOTE — PROGRESS NOTES
Virtual/Telephone     Pawel Duque verbally consents to a Virtual/Telephone visit service on 25.  Patient understands and accepts financial responsibility for any deductible, co-insurance and/or co-pays associated with this service.    Duration of the service: 20 minutes       Summary of topics discussed:  fibroids, surgery planning     [facility]  Obstetrics and Gynecology     Subjective:     HPI: Pawel Duque is a 44 year old  female with complaint of fibroids - is ready to schedule surgery.     OBGYN, medication, allergies, PMH, PSH, social history and family history reviewed with patient.     OB History:  OB History    Para Term  AB Living   0 0 0 0 0 0   SAB IAB Ectopic Multiple Live Births   0 0 0 0 0       Gyne History:  LMP Patient's last menstrual period was 12/10/2024 (exact date).         Meds:  Medications Ordered Prior to Encounter[1]    All:  Allergies[2]    PMH:  Past Medical History:    Fibroids    HTN (hypertension)    Hypothyroid    Migraine       PSH:  Past Surgical History:   Procedure Laterality Date    Breast biopsy Left     Kelsey localization wire 1 site left (cpt=19281)      Prior myomectomy         Social History:  Social History     Socioeconomic History    Marital status:      Spouse name: Not on file    Number of children: Not on file    Years of education: Not on file    Highest education level: Not on file   Occupational History    Not on file   Tobacco Use    Smoking status: Former     Current packs/day: 0.00     Types: Cigarettes     Quit date: 2015     Years since quitting: 10.0    Smokeless tobacco: Never   Vaping Use    Vaping status: Never Used   Substance and Sexual Activity    Alcohol use: Yes     Comment: 1-2 times per week    Drug use: Not Currently    Sexual activity: Yes     Partners: Male   Other Topics Concern    Not on file   Social History Narrative    Not on file     Social Drivers of Health      Financial Resource Strain: Not on file   Food Insecurity: Not on file   Transportation Needs: Not on file   Physical Activity: Not on file   Stress: Not on file   Social Connections: Not on file   Housing Stability: Not on file         Family History:  Family History   Problem Relation Age of Onset    Diabetes Mother         pre-diabetes    Other (prostate problems) Father     Heart Disease Sister     No Known Problems Sister     No Known Problems Brother        Review of Systems:  General:  denies fevers, chills, fatigue and malaise  Breast:  denies rashes, skin changes, pain, lumps or discharge   Respiratory:  denies SOB, dyspnea, cough or wheezing  Cardiovascular:  denies chest pain, palpitations  GI: denies abdominal pain, diarrhea, constipation  :  denies dysuria, hematuria, increased urinary frequency.   Heme:  denies history of excessive bleeding or bruising      Objective:   Vitals: not applicable due to telephone visit limitations     General: speaking in full sentences, no increased work in breathing, AAO.x 3.   Pelvic exam: deferred due to telephone encounter limitations     Imaging:  Pelvic US 24  Findings:   Left ovary   Length 3.34 cm   Width 3.45 cm   Height 2.21 cm   Volume 13.334     Right Ovary   Length 6.06 cm   Width 3.74 cm   Height 3.76 cm   Volume 44.620   Cyst: 3.9 x 3.3 cm     Uterus   Length 20.01 cm   Width 14.22 cm   Height 9.67 cm   Volume 1440.694   Endometrial Thickness 5.27 mm   Fibroid 1: 8.2 x 5.4 cm   Fibroid 2: 3.1 x 3.2 cm   Fibroid 3 fundal: 10.9 x 13.6 cm   Fibroid 4: 5.7 x 5.4 cm   Fibroid 5: 6.4 x 6.0 cm     Impression:   Uterus is enlarged with multiple fibroids seen. The 5 largest are measured, as noted above.   There is a simple cyst in the right ovary as measured above.   The left ovary appears normal.     Assessment:     Pawel Duque is a 44 year old  female with fibroids.     Patient Active Problem List   Diagnosis    Migraine without aura and  without status migrainosus, not intractable    Hypothyroidism, acquired    Uterine leiomyoma    Hypertension         Plan:     1. Fibroids  - reviewed US findings, compared to previous US from 10/2023. Discussed abdominal myomectomy - will likely do midline vertical incision, unless pfannenstiel seems likely once she is asleep and relaxed.  - reviewed risks including pain, bleeding - with high risk of need for blood transfusion, infection, injury to surrounding structures (bowel, bladder, ureters, etc), need for  in any future pregnancies. All questions answered.  - Will schedule abdominal myomectomy for 25       All of the findings and plan were discussed with the patient.  She notes understanding and agrees with the plan of care.  All questions were answered to the best of my ability at this time. I spent 20 minutes out of the 20 minute appointment discussing her medical and/or surgical options, please refer to above. The patient had many questions and concerns that were addressed.     Sharri Leach,        [1]   Current Outpatient Medications on File Prior to Visit   Medication Sig Dispense Refill    SUMAtriptan 50 MG Oral Tab Take 1 tablet (50 mg total) by mouth and may repeat in 2 (two) hours as needed for migraine.  Maximum 2 tablets in 24 hours. 9 tablet 1    metoprolol succinate ER 25 MG Oral Tablet 24 Hr Take 1 tablet (25 mg total) by mouth daily. 90 tablet 3    levothyroxine 50 MCG Oral Tab Take 1 tablet (50 mcg total) by mouth before breakfast. 90 tablet 3     No current facility-administered medications on file prior to visit.   [2] No Known Allergies

## 2025-01-06 NOTE — TELEPHONE ENCOUNTER
4/2 0730      ----- Message from Sharri Leach sent at 1/6/2025  1:16 PM CST -----  Regarding: pls schedule surgery  Please schedule the following surgery:    Procedure: abdominal myomectomy  Assist: roger cervantes CSA  Date: 4/2/25                               Time Requested: 0730  Dx: fibroids  Pre-op appt: na  Admission type: post-op admit  Anesthesia - general  Department of discharge(SDS/Floor): floor  Expected length of stay: 2-3 days  Procedure length time (please enter amount you are requesting): 3 hours  Recovery time (patients always ask): 6-8 weeks  Medical Clearance: (Y/N) n  Post- Op f/u appt time frame:  6 weeks post-op    Pre-op orders (choose one)   x Use [facility] procedure driven order set in addition to anesthesia protocol   Use [facility] surgeon's personalized order set   Surgeon to enter pre-op orders   No pre-op orders  x Use the prophylactic antibiotic protocol   No pre-op antibiotic orders indicated do not give antibiotic, if any, listed on the procedure driven order sets or personalized order sets    PCN allergy Yes___ or No_x__    If Yes: Proceed with PCN/Cephalosporin recommended antibiotic as benefit outweighs risk  Proceed with PCN/Cephalosporin recommended antibiotic as not a true allergy    Proceed with recommended alternative antibiotic for PCN allergy  ALL Medicaid/including [employer]BS community: Tubal/ Hyst form MUST be signed (30 days):    Message to nurses:  please schedule pre-op appointment for 2 weeks pre-surgery.  Thank you!  ~RD

## 2025-03-04 ENCOUNTER — TELEPHONE (OUTPATIENT)
Dept: OBGYN CLINIC | Facility: CLINIC | Age: 45
End: 2025-03-04

## 2025-03-04 DIAGNOSIS — D21.9 FIBROIDS: Primary | ICD-10-CM

## 2025-03-04 NOTE — TELEPHONE ENCOUNTER
Reached out to patient to schedule her 2 week ost op. Patient informed that she would like to reschedule her surgery for May 2025.

## 2025-03-13 ENCOUNTER — TELEPHONE (OUTPATIENT)
Dept: OBGYN CLINIC | Facility: CLINIC | Age: 45
End: 2025-03-13

## 2025-03-13 DIAGNOSIS — D21.9 FIBROIDS: Primary | ICD-10-CM

## 2025-03-13 NOTE — TELEPHONE ENCOUNTER
Patient is calling to reschedule her upcoming surgery that is scheduled for 5/7/25 and to rescheduled it for 5/21/25. Please follow up with patient.

## 2025-03-28 NOTE — TELEPHONE ENCOUNTER
Refill passed per protocol.    Requested Prescriptions   Pending Prescriptions Disp Refills    levothyroxine 50 MCG Oral Tab 90 tablet 3     Sig: Take 1 tablet (50 mcg total) by mouth before breakfast.       Thyroid Medication Protocol Passed - 3/28/2025  6:46 PM        Passed - TSH in past 12 months        Passed - Last TSH value is normal     Lab Results   Component Value Date    TSH 1.572 08/29/2024                 Passed - In person appointment or virtual visit in the past 12 mos or appointment in next 3 mos     Recent Outpatient Visits              2 months ago Fibroids    [facility] Sharri Leach,     Telemedicine    4 months ago Fibroids    [facility] OB/Sharri Mercado,     Office Visit    7 months ago Encounter for gynecological examination without abnormal finding    [facility] Fadia Sheldon MD    Office Visit    7 months ago Chronic left shoulder pain    [facility] TimberonFadia Dougherty MD    Office Visit    8 months ago Left shoulder pain, unspecified chronicity    [facility] Fadia Dougherty MD    Office Visit                      Passed - Medication is active on med list           Recent Outpatient Visits              2 months ago Fibroids    [facility] - OB/Sharri Mercado,     Telemedicine    4 months ago Fibroids    [facility]/Sharri Mercado,     Office Visit    7 months ago Encounter for gynecological examination without abnormal finding    [facility]adia Dougherty MD    Office Visit    7 months ago Chronic left shoulder pain    [facility]adia Dougherty MD    Office Visit    8 months ago Left  shoulder pain, unspecified chronicity    [facility] Fadia Sheldon MD    Office Visit

## 2025-03-28 NOTE — TELEPHONE ENCOUNTER
Although passes protocol, insurance might require patient to use a different pharmacy.   Upon signing, alert message about covered alternatives     Alert Message (see image below) indicates [facility] is out of Network    RN --- If no response received by Ruff[employer] or Phone call, please call patient to clarify if insurance plan uses [facility] or [facility].

## 2025-03-31 RX ORDER — LEVOTHYROXINE SODIUM 50 UG/1
50 TABLET ORAL
Qty: 90 TABLET | Refills: 3 | Status: SHIPPED | OUTPATIENT
Start: 2025-03-31

## 2025-03-31 NOTE — TELEPHONE ENCOUNTER
=Please check the insurance information and route it back to triage once confirmed. Thank you.    RN spoke with the patient, verified name and date of birth.   Patient confirmed the preferred pharmacy [facility] and insurance information.  See Sherley Murphy's note below.     States that her insurance on file might be  but she will update it via [employer]. Informed that we will wait for the update before refilling her medication  and she is also overdue for a follow-up appointment with Dr. Sheldon, instructed to schedule it also via Apps4All, patient agreed.      LAST VISIT NOTE 2024;  Instructions    Return in about 6 months (around 2025) for hypertension.  No future appointments.    Patient has not yet read the Apps[employer] message.   Delivery Read From Message Type Attachments Subject   3/28/2025  6:59 PM MARCO A Murphy, ASHTYN Patient Medication Renewal Request  Medication Renewal Request

## 2025-04-07 RX ORDER — LEVOTHYROXINE SODIUM 50 UG/1
50 TABLET ORAL
Qty: 90 TABLET | Refills: 3 | Status: SHIPPED | OUTPATIENT
Start: 2025-04-07

## 2025-04-07 NOTE — TELEPHONE ENCOUNTER
Disp Refills Start End    levothyroxine 50 MCG Oral Tab 90 tablet 3 3/31/2025 --    Sig - Route: Take 1 tablet (50 mcg total) by mouth before breakfast. - Oral    Sent to pharmacy as: Levothyroxine Sodium 50 MCG Oral Tablet (Synthroid)    E-Prescribing Status: Receipt confirmed by pharmacy (3/31/2025  2:59 PM CDT)      Pharmacy    [facility] - [address] [phone], [phone]      Disp Refills Start End    levothyroxine 50 MCG Oral Tab 90 tablet 3 4/7/2025 --    Sig - Route: Take 1 tablet (50 mcg total) by mouth before breakfast. - Oral    Sent to pharmacy as: Levothyroxine Sodium 50 MCG Oral Tablet (Synthroid)    E-Prescribing Status: Transmission to pharmacy in progress (4/7/2025  3:02 PM CDT)      Pharmacy    [id number] [place] [address] [address] [place] [facility] [place] [address], [phone], [phone]

## 2025-04-14 ENCOUNTER — TELEPHONE (OUTPATIENT)
Dept: OBGYN CLINIC | Facility: CLINIC | Age: 45
End: 2025-04-14

## 2025-04-15 NOTE — TELEPHONE ENCOUNTER
Received disability forms via e-mail in the forms department. Sent [employer] message for authorization. Logged for processing.

## 2025-04-18 NOTE — TELEPHONE ENCOUNTER
Patient called to provide the following details will task provider. Informed rep.   Type of Leave: Family Medical Leave Act   Reason for Leave: Surgery   Start date of leave: 5/21/2025  End date of leave: 6/21/2025  How many flare ups per month/length?:  How many appts per month/length?:   Was Fee and Turnaround info Given?:

## 2025-04-23 NOTE — TELEPHONE ENCOUNTER
Hi Dr. Leach,  Patient is requesting the following for Family Medical Leave Act due to her surgery.     Do you support?  Type of Leave: Family Medical Leave Act   Reason for Leave: Surgery   Start date of leave: 5/21/2025  End date of leave: 6/21/2025  Thank you  Vee

## 2025-04-24 ENCOUNTER — OFFICE VISIT (OUTPATIENT)
Dept: FAMILY MEDICINE CLINIC | Facility: CLINIC | Age: 45
End: 2025-04-24

## 2025-04-24 VITALS
BODY MASS INDEX: 28 KG/M2 | DIASTOLIC BLOOD PRESSURE: 81 MMHG | HEART RATE: 86 BPM | WEIGHT: 139 LBS | SYSTOLIC BLOOD PRESSURE: 139 MMHG | OXYGEN SATURATION: 95 %

## 2025-04-24 DIAGNOSIS — H91.90 HEARING LOSS, UNSPECIFIED HEARING LOSS TYPE, UNSPECIFIED LATERALITY: Primary | ICD-10-CM

## 2025-04-24 DIAGNOSIS — M26.629 TMJ SYNDROME: ICD-10-CM

## 2025-04-24 DIAGNOSIS — G89.29 CHRONIC LEFT SHOULDER PAIN: ICD-10-CM

## 2025-04-24 DIAGNOSIS — L60.8 CHANGE IN NAIL APPEARANCE: ICD-10-CM

## 2025-04-24 DIAGNOSIS — H93.19 TINNITUS, UNSPECIFIED LATERALITY: ICD-10-CM

## 2025-04-24 DIAGNOSIS — M25.512 CHRONIC LEFT SHOULDER PAIN: ICD-10-CM

## 2025-04-24 PROCEDURE — 3075F SYST BP GE 130 - 139MM HG: CPT | Performed by: FAMILY MEDICINE

## 2025-04-24 PROCEDURE — G2211 COMPLEX E/M VISIT ADD ON: HCPCS | Performed by: FAMILY MEDICINE

## 2025-04-24 PROCEDURE — 99214 OFFICE O/P EST MOD 30 MIN: CPT | Performed by: FAMILY MEDICINE

## 2025-04-24 PROCEDURE — 3079F DIAST BP 80-89 MM HG: CPT | Performed by: FAMILY MEDICINE

## 2025-04-24 RX ORDER — LEVOTHYROXINE SODIUM 50 UG/1
50 TABLET ORAL
Qty: 90 TABLET | Refills: 1 | Status: SHIPPED | OUTPATIENT
Start: 2025-04-24

## 2025-04-24 NOTE — PROGRESS NOTES
The following individual(s) verbally consented to be recorded using ambient AI listening technology and understand that they can each withdraw their consent to this listening technology at any point by asking the clinician to turn off or pause the recording:    Patient name: Pawel Duque  Additional names:

## 2025-04-24 NOTE — PROGRESS NOTES
Subjective:   Pawel Duque is a 44 year old female who presents for Follow - Up (Medication F/u on levothyroxine.), Fungus Nails (Pt C/o nail fungus on the right thumb nail. Pt states there is pain at times. ), and Ear Problem (Pt C/o having a hard time hearing out of the left ear. No pain.)       History/Other:   History of Present Illness  Pawel Duque is a 44 year old female who presents with concerns about her thyroid medication, nail changes, and hearing issues.    She is concerned about her thyroid medication, feeling 'overweight' despite a weight loss of ten pounds since November, which is lower than her weight last August. She is currently taking levothyroxine daily and has a 90-day supply with an additional week of refills to last until August.    She describes changes in her thumbnail as 'terrible' with occasional pain. She recalls using a nail drill to remove nail polish, which she believes may have caused trauma. She has tried an over-the-counter product called 'nail aid' from [employer], applied topically, but it has not alleviated the pain. No recent injury to the nail is reported.    She reports hearing issues, including difficulty hearing low tones and frequent tinnitus, which she associates with upcoming migraine attacks. The tinnitus occurs in either ear, not simultaneously, and is described as a high-pitched sound. She also experiences a sensation of ear fullness, similar to pressure changes on an airplane.    She mentions a long-standing issue with her jaw, including 'sticking' and clicking sounds, which she has experienced for many years. She does not report jaw pain but notes the clicking is persistent. She has a history of temporomandibular joint (TMJ) issues, which she believes may be contributing to her symptoms.    She has a scheduled myomectomy with Dr. Sharri Leach on May 21st. She expresses concerns about her ability to conceive and mentions prioritizing the upcoming surgery.       Chief Complaint Reviewed and Verified  Nursing Notes Reviewed and   Verified  Tobacco Reviewed  Allergies Reviewed  Medications Reviewed    OB Status Reviewed         Tobacco:  She smoked tobacco in the past but quit greater than 12 months ago.  Tobacco Use[1]     Current Medications[2]           Review of Systems:  See above      Objective:   /81   Pulse 86   Wt 139 lb (63 kg)   LMP 04/15/2025 (Exact Date)   SpO2 95%   BMI 28.07 kg/m²    Estimated body mass index is 28.07 kg/m² as calculated from the following:    Height as of 11/7/24: 4' 11\" (1.499 m).    Weight as of this encounter: 139 lb (63 kg).  Results  RADIOLOGY  No results found.       Physical Exam  Physical Exam  GENERAL: No acute distress, well developed, well nourished.  HEENT: External auditory canals clear, normal tympanic membranes.  TMJ crepitus bilaterally  Lungs clear  Cardiovascular regular rate and rhythm  MUSCULOSKELETAL: Right thumbnail with mild thickening and distal discoloration. TMJs with crepitus bilaterally.      Assessment & Plan:   1. Hearing loss, unspecified hearing loss type, unspecified laterality (Primary)  -     ENT - INTERNAL  -     OP REFERRAL TO AUDIOLOGY  2. Tinnitus, unspecified laterality  -     ENT - INTERNAL  -     OP REFERRAL TO AUDIOLOGY  3. Change in nail appearance  4. TMJ syndrome  5. Chronic left shoulder pain  Other orders  -     Levothyroxine Sodium; Take 1 tablet (50 mcg total) by mouth before breakfast.  Dispense: 90 tablet; Refill: 1      Assessment & Plan  Assessment & Plan  Temporomandibular joint syndrome (TMJ)  Chronic TMJ with jaw clicking and ear ringing, potentially contributing to neck and shoulder discomfort. Possible link between TMJ and ear symptoms discussed. Lifestyle modifications advised to alleviate symptoms.  - Avoid chewing gum, ice, bagels, and carrots.  - Use warm compresses on jaw.  - Consult dentist for evaluation and possible .  - Provide TMJ self-care  information.    Hearing loss and tinnitus  Intermittent hearing loss and tinnitus, possibly related to TMJ, with high-pitched ringing and ear fullness. Referral to ENT and audiology for further evaluation recommended.    Hypothyroidism  Last TSH normal in August. No current symptoms suggestive of hypothyroidism. Continue current management and re-evaluate TSH during routine physical in August to avoid overtreatment.  - Continue current dose of levothyroxine.  - Refill levothyroxine for 90 days.    Onychomycosis (suspected)  Suspected onychomycosis of right thumbnail with mild thickening and discoloration. Differential includes trauma from nail drill use. Testing to confirm diagnosis and potential treatment risks discussed. Over-the-counter treatments have a low success rate of 7-10%.  - Perform nail clipping and send for fungal testing in three weeks.  - Discuss potential liver damage risk with oral antifungal treatment.            Return in about 4 months (around 8/24/2025) for Routine physical, and 3 wk for nail test.      Fadia Sheldon MD              [1]   Social History  Tobacco Use   Smoking Status Former    Current packs/day: 0.00    Types: Cigarettes    Quit date: 1/1/2015    Years since quitting: 10.3   Smokeless Tobacco Never   [2]   Current Outpatient Medications   Medication Sig Dispense Refill    levothyroxine 50 MCG Oral Tab Take 1 tablet (50 mcg total) by mouth before breakfast. 90 tablet 1    SUMAtriptan 50 MG Oral Tab Take 1 tablet (50 mg total) by mouth and may repeat in 2 (two) hours as needed for migraine.  Maximum 2 tablets in 24 hours. 9 tablet 1    metoprolol succinate ER 25 MG Oral Tablet 24 Hr Take 1 tablet (25 mg total) by mouth daily. 90 tablet 3

## 2025-04-28 ENCOUNTER — TELEMEDICINE (OUTPATIENT)
Dept: TELEHEALTH | Age: 45
End: 2025-04-28

## 2025-04-28 DIAGNOSIS — Z02.9 ADMINISTRATIVE ENCOUNTER: Primary | ICD-10-CM

## 2025-04-28 NOTE — PROGRESS NOTES
Push notification and text sent to patient to connect.  Patient did not connect for over 30 mins after notifications.

## 2025-04-28 NOTE — TELEPHONE ENCOUNTER
Dr. Leach    Please sign off on form if you agree to: disability for surgery start 5/21/25 end 7/6/25 rtw 7/7/25    -Signature page will be the first page scanned  -From your Inbasket, Highlight the patient and click Chart   -Double click the 4/14/25 Forms Completion telephone encounter  -Scroll down to the Media section   -Click the blue Hyperlink: disability Dr. Leach 4/28/25  -Click Acknowledge located in the top right ribbon/menu   -Drag the mouse into the blank space of the document and a + sign will appear. Left click to   electronically sign the document.  -Once signed, simply exit out of the screen and you signature will be saved.     Thank you,  Cayla

## 2025-05-01 ENCOUNTER — TELEPHONE (OUTPATIENT)
Dept: OBGYN CLINIC | Facility: CLINIC | Age: 45
End: 2025-05-01

## 2025-05-02 ENCOUNTER — TELEPHONE (OUTPATIENT)
Dept: OBGYN CLINIC | Facility: CLINIC | Age: 45
End: 2025-05-02

## 2025-05-02 NOTE — TELEPHONE ENCOUNTER
Received a call from [facility] Mauricio requesting to speak with Ca regarding a PA .  Please give her a call back at .

## 2025-05-20 ENCOUNTER — OFFICE VISIT (OUTPATIENT)
Dept: FAMILY MEDICINE CLINIC | Facility: CLINIC | Age: 45
End: 2025-05-20
Payer: COMMERCIAL

## 2025-05-20 ENCOUNTER — LAB ENCOUNTER (OUTPATIENT)
Dept: LAB | Age: 45
End: 2025-05-20
Attending: OBSTETRICS & GYNECOLOGY
Payer: COMMERCIAL

## 2025-05-20 ENCOUNTER — TELEPHONE (OUTPATIENT)
Dept: OBGYN CLINIC | Facility: CLINIC | Age: 45
End: 2025-05-20

## 2025-05-20 VITALS
DIASTOLIC BLOOD PRESSURE: 82 MMHG | OXYGEN SATURATION: 96 % | BODY MASS INDEX: 28 KG/M2 | WEIGHT: 139 LBS | HEART RATE: 65 BPM | SYSTOLIC BLOOD PRESSURE: 145 MMHG

## 2025-05-20 DIAGNOSIS — Z01.818 PRE-OP TESTING: ICD-10-CM

## 2025-05-20 DIAGNOSIS — B35.1 ONYCHOMYCOSIS: Primary | ICD-10-CM

## 2025-05-20 LAB
ANTIBODY SCREEN: NEGATIVE
BASOPHILS # BLD AUTO: 0.08 X10(3) UL (ref 0–0.2)
BASOPHILS NFR BLD AUTO: 0.9 %
DEPRECATED RDW RBC AUTO: 42.3 FL (ref 35.1–46.3)
EOSINOPHIL # BLD AUTO: 0.08 X10(3) UL (ref 0–0.7)
EOSINOPHIL NFR BLD AUTO: 0.9 %
ERYTHROCYTE [DISTWIDTH] IN BLOOD BY AUTOMATED COUNT: 12.7 % (ref 11–15)
HCT VFR BLD AUTO: 43.1 % (ref 35–48)
HGB BLD-MCNC: 14.2 G/DL (ref 12–16)
IMM GRANULOCYTES # BLD AUTO: 0.06 X10(3) UL (ref 0–1)
IMM GRANULOCYTES NFR BLD: 0.6 %
LYMPHOCYTES # BLD AUTO: 1.71 X10(3) UL (ref 1–4)
LYMPHOCYTES NFR BLD AUTO: 18.4 %
MCH RBC QN AUTO: 30.1 PG (ref 26–34)
MCHC RBC AUTO-ENTMCNC: 32.9 G/DL (ref 31–37)
MCV RBC AUTO: 91.3 FL (ref 80–100)
MONOCYTES # BLD AUTO: 0.78 X10(3) UL (ref 0.1–1)
MONOCYTES NFR BLD AUTO: 8.4 %
NEUTROPHILS # BLD AUTO: 6.6 X10 (3) UL (ref 1.5–7.7)
NEUTROPHILS # BLD AUTO: 6.6 X10(3) UL (ref 1.5–7.7)
NEUTROPHILS NFR BLD AUTO: 70.8 %
PLATELET # BLD AUTO: 411 10(3)UL (ref 150–450)
RBC # BLD AUTO: 4.72 X10(6)UL (ref 3.8–5.3)
RH BLOOD TYPE: POSITIVE
WBC # BLD AUTO: 9.3 X10(3) UL (ref 4–11)

## 2025-05-20 PROCEDURE — 85025 COMPLETE CBC W/AUTO DIFF WBC: CPT | Performed by: OBSTETRICS & GYNECOLOGY

## 2025-05-20 PROCEDURE — 86900 BLOOD TYPING SEROLOGIC ABO: CPT | Performed by: OBSTETRICS & GYNECOLOGY

## 2025-05-20 PROCEDURE — 3077F SYST BP >= 140 MM HG: CPT | Performed by: FAMILY MEDICINE

## 2025-05-20 PROCEDURE — 86901 BLOOD TYPING SEROLOGIC RH(D): CPT | Performed by: OBSTETRICS & GYNECOLOGY

## 2025-05-20 PROCEDURE — 99213 OFFICE O/P EST LOW 20 MIN: CPT | Performed by: FAMILY MEDICINE

## 2025-05-20 PROCEDURE — 86850 RBC ANTIBODY SCREEN: CPT | Performed by: OBSTETRICS & GYNECOLOGY

## 2025-05-20 PROCEDURE — G2211 COMPLEX E/M VISIT ADD ON: HCPCS | Performed by: FAMILY MEDICINE

## 2025-05-20 PROCEDURE — 3079F DIAST BP 80-89 MM HG: CPT | Performed by: FAMILY MEDICINE

## 2025-05-20 RX ORDER — IBUPROFEN 600 MG/1
600 TABLET, FILM COATED ORAL EVERY 6 HOURS PRN
COMMUNITY
Start: 2025-05-15 | End: 2025-05-23

## 2025-05-20 RX ORDER — METHYLPREDNISOLONE 4 MG/1
4 TABLET ORAL DAILY
COMMUNITY
Start: 2025-05-16

## 2025-05-20 NOTE — PROGRESS NOTES
Subjective:   Pawel Duque is a 44 year old female who presents for Fungus Nails (Pt C/o possible fungus on right thumb nail. )       History/Other:   History of Present Illness  Pawel Duque is a 44 year old female who presents with a thickened nail on her right thumb.    The nail on her right thumb is thickened. She has not applied any polish or lacquer to the nail recently and has not used any antifungal medications such as terbinafine. She is scheduled for a myomectomy tomorrow and has stopped her current medication in preparation for the surgery.      Chief Complaint Reviewed and Verified  Nursing Notes Reviewed and   Verified  Tobacco Reviewed  Allergies Reviewed  Medications Reviewed    OB Status Reviewed         Tobacco:  She smoked tobacco in the past but quit greater than 12 months ago.  Tobacco Use[1]     Current Medications[2]           Review of Systems:  See above      Objective:   /82   Pulse 65   Wt 139 lb (63 kg)   LMP 05/13/2025 (Exact Date)   SpO2 96%   BMI 28.07 kg/m²    Estimated body mass index is 28.07 kg/m² as calculated from the following:    Height as of an earlier encounter on 5/20/25: 4' 11\" (1.499 m).    Weight as of this encounter: 139 lb (63 kg).  Results  RADIOLOGY  No results found.       Physical Exam  Physical Exam  Right thumb nail thickened and yellow.        Assessment & Plan:   1. Onychomycosis (Primary)      Assessment & Plan  Assessment & Plan  Onychomycosis  Thickened nail on the right great toe. Awaiting fungal identification results from [facility] lab to confirm diagnosis and guide treatment. No prior use of antifungal medication such as terbinafine.  - Advise notification if billed by [facility] lab  - Await lab results to determine further treatment recommendations            No follow-ups on file.      Fadia Sheldon MD              [1]   Social History  Tobacco Use   Smoking Status Former    Current packs/day: 0.00    Types: Cigarettes    Quit  date: 1/1/2015    Years since quitting: 10.3   Smokeless Tobacco Never   [2]   Current Outpatient Medications   Medication Sig Dispense Refill    ibuprofen 600 MG Oral Tab Take 1 tablet (600 mg total) by mouth every 6 (six) hours as needed.      methylPREDNISolone 4 MG Oral Tab Take 1 tablet (4 mg total) by mouth daily.      levothyroxine 50 MCG Oral Tab Take 1 tablet (50 mcg total) by mouth before breakfast. 90 tablet 1    SUMAtriptan 50 MG Oral Tab Take 1 tablet (50 mg total) by mouth and may repeat in 2 (two) hours as needed for migraine.  Maximum 2 tablets in 24 hours. 9 tablet 1    metoprolol succinate ER 25 MG Oral Tablet 24 Hr Take 1 tablet (25 mg total) by mouth daily. 90 tablet 3

## 2025-05-20 NOTE — TELEPHONE ENCOUNTER
I spoke with Shyann GALVEZ (Payer) she informed that the patient is approved for surgery with CPT 11535 for 5/21 inpatient procedure Laparotmy myomectomy.

## 2025-05-21 ENCOUNTER — ANESTHESIA EVENT (OUTPATIENT)
Dept: SURGERY | Facility: HOSPITAL | Age: 45
End: 2025-05-21
Payer: COMMERCIAL

## 2025-05-21 ENCOUNTER — HOSPITAL ENCOUNTER (INPATIENT)
Facility: HOSPITAL | Age: 45
LOS: 2 days | Discharge: HOME | End: 2025-05-23
Attending: OBSTETRICS & GYNECOLOGY | Admitting: OBSTETRICS & GYNECOLOGY
Payer: COMMERCIAL

## 2025-05-21 ENCOUNTER — ANESTHESIA (OUTPATIENT)
Dept: SURGERY | Facility: HOSPITAL | Age: 45
End: 2025-05-21
Payer: COMMERCIAL

## 2025-05-21 DIAGNOSIS — Z01.818 PRE-OP TESTING: Primary | ICD-10-CM

## 2025-05-21 DIAGNOSIS — D21.9 FIBROIDS: ICD-10-CM

## 2025-05-21 PROBLEM — Z98.890 POST-OPERATIVE STATE: Status: ACTIVE | Noted: 2025-05-21

## 2025-05-21 LAB
B-HCG UR QL: NEGATIVE
DEPRECATED RDW RBC AUTO: 46.9 FL (ref 35.1–46.3)
ERYTHROCYTE [DISTWIDTH] IN BLOOD BY AUTOMATED COUNT: 14.3 % (ref 11–15)
HCT VFR BLD AUTO: 25.5 % (ref 35–48)
HCT VFR BLD AUTO: 34.2 % (ref 35–48)
HGB BLD-MCNC: 11.5 G/DL (ref 12–16)
HGB BLD-MCNC: 8.9 G/DL (ref 12–16)
MCH RBC QN AUTO: 30.6 PG (ref 26–34)
MCHC RBC AUTO-ENTMCNC: 33.6 G/DL (ref 31–37)
MCV RBC AUTO: 91 FL (ref 80–100)
PLATELET # BLD AUTO: 266 10(3)UL (ref 150–450)
RBC # BLD AUTO: 3.76 X10(6)UL (ref 3.8–5.3)
RH BLOOD TYPE: POSITIVE
WBC # BLD AUTO: 21.9 X10(3) UL (ref 4–11)

## 2025-05-21 PROCEDURE — 0UB90ZZ EXCISION OF UTERUS, OPEN APPROACH: ICD-10-PCS | Performed by: OBSTETRICS & GYNECOLOGY

## 2025-05-21 PROCEDURE — P9045 ALBUMIN (HUMAN), 5%, 250 ML: HCPCS | Performed by: ANESTHESIOLOGY

## 2025-05-21 PROCEDURE — 36430 TRANSFUSION BLD/BLD COMPNT: CPT | Performed by: ANESTHESIOLOGY

## 2025-05-21 PROCEDURE — 58146 MYOMECTOMY ABDOM COMPLEX: CPT | Performed by: OBSTETRICS & GYNECOLOGY

## 2025-05-21 PROCEDURE — 30233N1 TRANSFUSION OF NONAUTOLOGOUS RED BLOOD CELLS INTO PERIPHERAL VEIN, PERCUTANEOUS APPROACH: ICD-10-PCS | Performed by: ANESTHESIOLOGY

## 2025-05-21 RX ORDER — MORPHINE SULFATE 4 MG/ML
2 INJECTION, SOLUTION INTRAMUSCULAR; INTRAVENOUS EVERY 10 MIN PRN
Status: DISCONTINUED | OUTPATIENT
Start: 2025-05-21 | End: 2025-05-21 | Stop reason: HOSPADM

## 2025-05-21 RX ORDER — EPHEDRINE SULFATE 50 MG/ML
INJECTION, SOLUTION INTRAVENOUS AS NEEDED
Status: DISCONTINUED | OUTPATIENT
Start: 2025-05-21 | End: 2025-05-21 | Stop reason: SURG

## 2025-05-21 RX ORDER — TRANEXAMIC ACID 100 MG/ML
INJECTION, SOLUTION INTRAVENOUS AS NEEDED
Status: DISCONTINUED | OUTPATIENT
Start: 2025-05-21 | End: 2025-05-21 | Stop reason: SURG

## 2025-05-21 RX ORDER — MORPHINE SULFATE 4 MG/ML
4 INJECTION, SOLUTION INTRAMUSCULAR; INTRAVENOUS EVERY 10 MIN PRN
Status: DISCONTINUED | OUTPATIENT
Start: 2025-05-21 | End: 2025-05-21 | Stop reason: HOSPADM

## 2025-05-21 RX ORDER — ENOXAPARIN SODIUM 100 MG/ML
40 INJECTION SUBCUTANEOUS EVERY 24 HOURS
Status: DISCONTINUED | OUTPATIENT
Start: 2025-05-21 | End: 2025-05-23

## 2025-05-21 RX ORDER — HYDROMORPHONE HYDROCHLORIDE 1 MG/ML
0.6 INJECTION, SOLUTION INTRAMUSCULAR; INTRAVENOUS; SUBCUTANEOUS EVERY 5 MIN PRN
Refills: 0 | Status: DISCONTINUED | OUTPATIENT
Start: 2025-05-21 | End: 2025-05-21 | Stop reason: HOSPADM

## 2025-05-21 RX ORDER — ONDANSETRON 2 MG/ML
INJECTION INTRAMUSCULAR; INTRAVENOUS AS NEEDED
Status: DISCONTINUED | OUTPATIENT
Start: 2025-05-21 | End: 2025-05-21 | Stop reason: SURG

## 2025-05-21 RX ORDER — SODIUM CHLORIDE, SODIUM LACTATE, POTASSIUM CHLORIDE, CALCIUM CHLORIDE 600; 310; 30; 20 MG/100ML; MG/100ML; MG/100ML; MG/100ML
INJECTION, SOLUTION INTRAVENOUS CONTINUOUS
Status: DISCONTINUED | OUTPATIENT
Start: 2025-05-21 | End: 2025-05-21 | Stop reason: HOSPADM

## 2025-05-21 RX ORDER — METOCLOPRAMIDE HYDROCHLORIDE 5 MG/ML
10 INJECTION INTRAMUSCULAR; INTRAVENOUS EVERY 6 HOURS PRN
Status: DISCONTINUED | OUTPATIENT
Start: 2025-05-21 | End: 2025-05-23

## 2025-05-21 RX ORDER — ONDANSETRON 2 MG/ML
4 INJECTION INTRAMUSCULAR; INTRAVENOUS EVERY 8 HOURS PRN
Status: DISCONTINUED | OUTPATIENT
Start: 2025-05-21 | End: 2025-05-23

## 2025-05-21 RX ORDER — DIPHENHYDRAMINE HYDROCHLORIDE 50 MG/ML
12.5 INJECTION, SOLUTION INTRAMUSCULAR; INTRAVENOUS EVERY 4 HOURS PRN
Status: DISCONTINUED | OUTPATIENT
Start: 2025-05-21 | End: 2025-05-22

## 2025-05-21 RX ORDER — SODIUM CHLORIDE 9 MG/ML
INJECTION, SOLUTION INTRAVENOUS CONTINUOUS PRN
Status: DISCONTINUED | OUTPATIENT
Start: 2025-05-21 | End: 2025-05-21 | Stop reason: SURG

## 2025-05-21 RX ORDER — NALOXONE HYDROCHLORIDE 0.4 MG/ML
0.08 INJECTION, SOLUTION INTRAMUSCULAR; INTRAVENOUS; SUBCUTANEOUS
Status: DISCONTINUED | OUTPATIENT
Start: 2025-05-21 | End: 2025-05-21

## 2025-05-21 RX ORDER — ONDANSETRON 2 MG/ML
4 INJECTION INTRAMUSCULAR; INTRAVENOUS EVERY 6 HOURS PRN
Status: DISCONTINUED | OUTPATIENT
Start: 2025-05-21 | End: 2025-05-21

## 2025-05-21 RX ORDER — VASOPRESSIN 20 [USP'U]/ML
INJECTION, SOLUTION INTRAVENOUS AS NEEDED
Status: DISCONTINUED | OUTPATIENT
Start: 2025-05-21 | End: 2025-05-21 | Stop reason: HOSPADM

## 2025-05-21 RX ORDER — HYDRALAZINE HYDROCHLORIDE 20 MG/ML
INJECTION INTRAMUSCULAR; INTRAVENOUS AS NEEDED
Status: DISCONTINUED | OUTPATIENT
Start: 2025-05-21 | End: 2025-05-21 | Stop reason: SURG

## 2025-05-21 RX ORDER — HYDROMORPHONE HYDROCHLORIDE 1 MG/ML
0.2 INJECTION, SOLUTION INTRAMUSCULAR; INTRAVENOUS; SUBCUTANEOUS EVERY 5 MIN PRN
Refills: 0 | Status: DISCONTINUED | OUTPATIENT
Start: 2025-05-21 | End: 2025-05-21 | Stop reason: HOSPADM

## 2025-05-21 RX ORDER — KETOROLAC TROMETHAMINE 30 MG/ML
30 INJECTION, SOLUTION INTRAMUSCULAR; INTRAVENOUS EVERY 6 HOURS
Status: COMPLETED | OUTPATIENT
Start: 2025-05-21 | End: 2025-05-22

## 2025-05-21 RX ORDER — ROCURONIUM BROMIDE 10 MG/ML
INJECTION, SOLUTION INTRAVENOUS AS NEEDED
Status: DISCONTINUED | OUTPATIENT
Start: 2025-05-21 | End: 2025-05-21 | Stop reason: SURG

## 2025-05-21 RX ORDER — HYDROMORPHONE HYDROCHLORIDE 1 MG/ML
0.4 INJECTION, SOLUTION INTRAMUSCULAR; INTRAVENOUS; SUBCUTANEOUS EVERY 5 MIN PRN
Refills: 0 | Status: DISCONTINUED | OUTPATIENT
Start: 2025-05-21 | End: 2025-05-21 | Stop reason: HOSPADM

## 2025-05-21 RX ORDER — SODIUM CHLORIDE, SODIUM LACTATE, POTASSIUM CHLORIDE, CALCIUM CHLORIDE 600; 310; 30; 20 MG/100ML; MG/100ML; MG/100ML; MG/100ML
INJECTION, SOLUTION INTRAVENOUS CONTINUOUS
Status: DISCONTINUED | OUTPATIENT
Start: 2025-05-21 | End: 2025-05-23

## 2025-05-21 RX ORDER — ACETAMINOPHEN 325 MG/1
650 TABLET ORAL
Status: DISCONTINUED | OUTPATIENT
Start: 2025-05-21 | End: 2025-05-23

## 2025-05-21 RX ORDER — SODIUM CHLORIDE 9 MG/ML
INJECTION, SOLUTION INTRAVENOUS CONTINUOUS
Status: DISCONTINUED | OUTPATIENT
Start: 2025-05-21 | End: 2025-05-22

## 2025-05-21 RX ORDER — IBUPROFEN 600 MG/1
600 TABLET, FILM COATED ORAL EVERY 6 HOURS SCHEDULED
Status: DISCONTINUED | OUTPATIENT
Start: 2025-05-22 | End: 2025-05-23

## 2025-05-21 RX ORDER — MIDAZOLAM HYDROCHLORIDE 1 MG/ML
INJECTION INTRAMUSCULAR; INTRAVENOUS AS NEEDED
Status: DISCONTINUED | OUTPATIENT
Start: 2025-05-21 | End: 2025-05-21 | Stop reason: SURG

## 2025-05-21 RX ORDER — NALOXONE HYDROCHLORIDE 0.4 MG/ML
0.08 INJECTION, SOLUTION INTRAMUSCULAR; INTRAVENOUS; SUBCUTANEOUS AS NEEDED
Status: DISCONTINUED | OUTPATIENT
Start: 2025-05-21 | End: 2025-05-21 | Stop reason: HOSPADM

## 2025-05-21 RX ORDER — BUPIVACAINE HYDROCHLORIDE 2.5 MG/ML
INJECTION, SOLUTION EPIDURAL; INFILTRATION; INTRACAUDAL; PERINEURAL AS NEEDED
Status: DISCONTINUED | OUTPATIENT
Start: 2025-05-21 | End: 2025-05-21 | Stop reason: HOSPADM

## 2025-05-21 RX ORDER — ACETAMINOPHEN 500 MG
1000 TABLET ORAL ONCE
Status: COMPLETED | OUTPATIENT
Start: 2025-05-21 | End: 2025-05-21

## 2025-05-21 RX ORDER — DIPHENHYDRAMINE HYDROCHLORIDE 50 MG/ML
12.5 INJECTION, SOLUTION INTRAMUSCULAR; INTRAVENOUS EVERY 4 HOURS PRN
Status: DISCONTINUED | OUTPATIENT
Start: 2025-05-21 | End: 2025-05-21

## 2025-05-21 RX ORDER — PHENYLEPHRINE HCL 10 MG/ML
VIAL (ML) INJECTION AS NEEDED
Status: DISCONTINUED | OUTPATIENT
Start: 2025-05-21 | End: 2025-05-21 | Stop reason: SURG

## 2025-05-21 RX ORDER — METOPROLOL TARTRATE 25 MG/1
25 TABLET, FILM COATED ORAL ONCE AS NEEDED
Status: DISCONTINUED | OUTPATIENT
Start: 2025-05-21 | End: 2025-05-21 | Stop reason: HOSPADM

## 2025-05-21 RX ORDER — NALOXONE HYDROCHLORIDE 0.4 MG/ML
0.08 INJECTION, SOLUTION INTRAMUSCULAR; INTRAVENOUS; SUBCUTANEOUS
Status: DISCONTINUED | OUTPATIENT
Start: 2025-05-21 | End: 2025-05-22

## 2025-05-21 RX ORDER — MORPHINE SULFATE 10 MG/ML
6 INJECTION, SOLUTION INTRAMUSCULAR; INTRAVENOUS EVERY 10 MIN PRN
Refills: 0 | Status: DISCONTINUED | OUTPATIENT
Start: 2025-05-21 | End: 2025-05-21 | Stop reason: HOSPADM

## 2025-05-21 RX ORDER — DEXAMETHASONE SODIUM PHOSPHATE 4 MG/ML
VIAL (ML) INJECTION AS NEEDED
Status: DISCONTINUED | OUTPATIENT
Start: 2025-05-21 | End: 2025-05-21 | Stop reason: SURG

## 2025-05-21 RX ORDER — ALBUMIN HUMAN 50 G/1000ML
SOLUTION INTRAVENOUS CONTINUOUS PRN
Status: DISCONTINUED | OUTPATIENT
Start: 2025-05-21 | End: 2025-05-21 | Stop reason: SURG

## 2025-05-21 RX ORDER — ONDANSETRON 4 MG/1
4 TABLET, FILM COATED ORAL EVERY 8 HOURS PRN
Status: DISCONTINUED | OUTPATIENT
Start: 2025-05-21 | End: 2025-05-23

## 2025-05-21 RX ORDER — SODIUM CHLORIDE 9 MG/ML
INJECTION, SOLUTION INTRAVENOUS CONTINUOUS
Status: DISCONTINUED | OUTPATIENT
Start: 2025-05-21 | End: 2025-05-21

## 2025-05-21 RX ADMIN — ONDANSETRON 4 MG: 2 INJECTION INTRAMUSCULAR; INTRAVENOUS at 10:17:00

## 2025-05-21 RX ADMIN — EPHEDRINE SULFATE 10 MG: 50 INJECTION, SOLUTION INTRAVENOUS at 09:20:00

## 2025-05-21 RX ADMIN — HYDRALAZINE HYDROCHLORIDE 5 MG: 20 INJECTION INTRAMUSCULAR; INTRAVENOUS at 08:01:00

## 2025-05-21 RX ADMIN — SODIUM CHLORIDE: 9 INJECTION, SOLUTION INTRAVENOUS at 08:25:00

## 2025-05-21 RX ADMIN — SODIUM CHLORIDE, SODIUM LACTATE, POTASSIUM CHLORIDE, CALCIUM CHLORIDE: 600; 310; 30; 20 INJECTION, SOLUTION INTRAVENOUS at 08:32:00

## 2025-05-21 RX ADMIN — DEXAMETHASONE SODIUM PHOSPHATE 8 MG: 4 MG/ML VIAL (ML) INJECTION at 07:42:00

## 2025-05-21 RX ADMIN — ALBUMIN HUMAN: 50 SOLUTION INTRAVENOUS at 10:59:00

## 2025-05-21 RX ADMIN — PHENYLEPHRINE HCL 100 MCG: 10 MG/ML VIAL (ML) INJECTION at 08:15:00

## 2025-05-21 RX ADMIN — PHENYLEPHRINE HCL 100 MCG: 10 MG/ML VIAL (ML) INJECTION at 08:25:00

## 2025-05-21 RX ADMIN — TRANEXAMIC ACID 1000 MG: 100 INJECTION, SOLUTION INTRAVENOUS at 09:31:00

## 2025-05-21 RX ADMIN — PHENYLEPHRINE HCL 100 MCG: 10 MG/ML VIAL (ML) INJECTION at 08:35:00

## 2025-05-21 RX ADMIN — PHENYLEPHRINE HCL 200 MCG: 10 MG/ML VIAL (ML) INJECTION at 09:18:00

## 2025-05-21 RX ADMIN — ONDANSETRON 4 MG: 2 INJECTION INTRAMUSCULAR; INTRAVENOUS at 07:42:00

## 2025-05-21 RX ADMIN — PHENYLEPHRINE HCL 200 MCG: 10 MG/ML VIAL (ML) INJECTION at 09:11:00

## 2025-05-21 RX ADMIN — ROCURONIUM BROMIDE 50 MG: 10 INJECTION, SOLUTION INTRAVENOUS at 07:33:00

## 2025-05-21 RX ADMIN — ROCURONIUM BROMIDE 20 MG: 10 INJECTION, SOLUTION INTRAVENOUS at 08:31:00

## 2025-05-21 RX ADMIN — MIDAZOLAM HYDROCHLORIDE 2 MG: 1 INJECTION INTRAMUSCULAR; INTRAVENOUS at 07:29:00

## 2025-05-21 RX ADMIN — ALBUMIN HUMAN: 50 SOLUTION INTRAVENOUS at 10:37:00

## 2025-05-21 NOTE — ANESTHESIA POSTPROCEDURE EVALUATION
Patient: Pawel Duque    Procedure Summary       Date: 05/21/25 Room / Location: [facility] MAIN OR [address] [place] [address] MAIN OR    Anesthesia Start: 0729 Anesthesia Stop: 1043    Procedure: Laparotomy myomectomy (Abdomen) Diagnosis:       Fibroids      (Fibroids [D21.9])    Surgeons: Sharri Leach DO Anesthesiologist: Michelle Lozada MD    Anesthesia Type: general ASA Status: 2            Anesthesia Type: general    Vitals Value Taken Time   BP 76/40 05/21/25 10:44   Temp 98.6 °F (37 °C) 05/21/25 10:44   Pulse 76 05/21/25 10:44   Resp 15 05/21/25 10:44   SpO2 98 % 05/21/25 10:44       [facility] AN Post Evaluation:   Patient Evaluated in PACU  Patient Participation: complete - patient participated  Level of Consciousness: awake  Pain Score: 0  Pain Management: adequate  Airway Patency:patent  Dental exam unchanged from preop  Yes    Cardiovascular Status: acceptable  Respiratory Status: acceptable  Postoperative Hydration acceptable      Michelle Lozada MD  5/21/2025 10:44 AM

## 2025-05-21 NOTE — PLAN OF CARE
Pt is alert and oriented x4. On 2L oxygen. BP runs low. MD notified. 1L bolus given. IVF at 125 ml/hr. Repeat BP at 107/69. Hgb repeat at 7pm. Ambulatory at baseline. Did not get up during day shift. Reported nausea after coming to the floor. Reglan given. Pt states nausea is resolved. On general diet. Tolerating well. On PCA pump. Dilaudid. Safety measures in place. Will continue to monitor.     Problem: Patient Centered Care  Goal: Patient preferences are identified and integrated in the patient's plan of care  Description: Interventions:  - Provide timely, complete, and accurate information to patient/family  - Incorporate patient and family knowledge, values, beliefs, and cultural backgrounds into the planning and delivery of care  - Encourage patient/family to participate in care and decision-making at the level they choose  - Honor patient and family perspectives and choices  Outcome: Progressing

## 2025-05-21 NOTE — ANESTHESIA PREPROCEDURE EVALUATION
Anesthesia PreOp Note    HPI:     Pawel Duque is a 44 year old female who presents for preoperative consultation requested by: Sharri Leach DO    Date of Surgery: 5/21/2025    Procedure(s):  Laparotomy myomectomy  Indication: Fibroids [D21.9]    Relevant Problems   No relevant active problems       NPO:  Last Liquid Consumption Date: 05/21/25  Last Liquid Consumption Time: 0430 (sips of water)  Last Solid Consumption Date: 05/20/25  Last Solid Consumption Time: 1600  Last Liquid Consumption Date: 05/21/25          History Review:  Patient Active Problem List    Diagnosis Date Noted    Uterine leiomyoma 03/08/2023    Migraine without aura and without status migrainosus, not intractable 06/01/2021    Hypothyroidism, acquired 06/01/2021    Hypertension 09/25/2019       Past Medical History[1]    Past Surgical History[2]    Prescriptions Prior to Admission[3]  Current Medications and Prescriptions Ordered in Epic[4]    Allergies[5]    Family History[6]  Social Hx on file[7]    Available pre-op labs reviewed.  Lab Results   Component Value Date    WBC 9.3 05/20/2025    RBC 4.72 05/20/2025    HGB 14.2 05/20/2025    HCT 43.1 05/20/2025    MCV 91.3 05/20/2025    MCH 30.1 05/20/2025    MCHC 32.9 05/20/2025    RDW 12.7 05/20/2025    .0 05/20/2025    URINEPREG Negative 05/21/2025             Vital Signs:  Body mass index is 27.67 kg/m².   height is 1.499 m (4' 11\") and weight is 62.1 kg (137 lb). Her oral temperature is 98 °F (36.7 °C). Her blood pressure is 143/83 and her pulse is 61. Her respiration is 16 and oxygen saturation is 97%.   Vitals:    05/20/25 0916 05/21/25 0607 05/21/25 0645   BP:  (!) 178/97 143/83   Pulse:  58 61   Resp:  16    Temp:  98 °F (36.7 °C)    TempSrc:  Oral    SpO2:  97%    Weight: 62.6 kg (138 lb) 62.1 kg (137 lb)    Height: 1.499 m (4' 11\")          Anesthesia Evaluation     Patient summary reviewed and Nursing notes reviewed    Airway   Mallampati: II  TM distance: >3 FB  Neck  ROM: full  Dental      Pulmonary - normal exam   Cardiovascular - normal exam  Exercise tolerance: good  (+) hypertension    Neuro/Psych      GI/Hepatic/Renal      Endo/Other    (+) hypothyroidism  Abdominal                  Anesthesia Plan:   ASA:  2  Plan:   General  Airway:  ETT  Post-op Pain Management: IV analgesics  Informed Consent Plan and Risks Discussed With:  Patient  Discussed plan with:  Surgeon      I have informed Pawel Duque and/or legal guardian or family member of the nature of the anesthetic plan, benefits, risks including possible dental damage if relevant, major complications, and any alternative forms of anesthetic management.   All of the patient's questions were answered to the best of my ability. The patient desires the anesthetic management as planned.  Michelle Lozada MD  5/21/2025 7:18 AM  Present on Admission:  **None**           [1]   Past Medical History:   Disorder of thyroid    Fibroids    High blood pressure    HTN (hypertension)    Hx of motion sickness    Hypothyroid    Migraine    Migraines   [2]   Past Surgical History:  Procedure Laterality Date    Breast biopsy Left     Kelsey localization wire 1 site left (cpt=[id number])      Prior myomectomy     [3]   Medications Prior to Admission   Medication Sig Dispense Refill Last Dose/Taking    ibuprofen 600 MG Oral Tab Take 1 tablet (600 mg total) by mouth every 6 (six) hours as needed.   Past Week    methylPREDNISolone 4 MG Oral Tab Take 1 tablet (4 mg total) by mouth daily.   5/21/2025 at  4:30 AM    levothyroxine 50 MCG Oral Tab Take 1 tablet (50 mcg total) by mouth before breakfast. 90 tablet 1 5/21/2025 at  4:30 AM    metoprolol succinate ER 25 MG Oral Tablet 24 Hr Take 1 tablet (25 mg total) by mouth daily. 90 tablet 3 5/21/2025 at  4:30 AM    SUMAtriptan 50 MG Oral Tab Take 1 tablet (50 mg total) by mouth and may repeat in 2 (two) hours as needed for migraine.  Maximum 2 tablets in 24 hours. 9 tablet 1 More than a month   [4]    Current Facility-Administered Medications Ordered in Epic   Medication Dose Route Frequency Provider Last Rate Last Admin    lactated ringers infusion   Intravenous Continuous Sharri Leach, DO 20 mL/hr at 05/21/25 0619 New Bag at 05/21/25 0619    metoprolol tartrate (Lopressor) tab 25 mg  25 mg Oral Once PRN Sharri Leach T, DO        ceFAZolin (Ancef) 2g in 10mL IV syringe premix  2 g Intravenous Once Sharri Leach T, DO         No current [facility]-ordered outpatient medications on file.   [5] No Known Allergies  [6]   Family History  Problem Relation Age of Onset    Diabetes Mother         pre-diabetes    Other (prostate problems) Father     Heart Disease Sister     No Known Problems Sister     No Known Problems Brother    [7]   Social History  Socioeconomic History    Marital status:    Tobacco Use    Smoking status: Former     Current packs/day: 0.00     Types: Cigarettes     Quit date: 1/1/2015     Years since quitting: 10.3    Smokeless tobacco: Never   Vaping Use    Vaping status: Never Used   Substance and Sexual Activity    Alcohol use: Yes     Comment: 1-2 times per week    Drug use: Not Currently    Sexual activity: Yes     Partners: Male

## 2025-05-21 NOTE — H&P
GYN Pre-op History and Physical  [facility]    CHIEF COMPLAINT: Scheduled surgery   HISTORY OF PRESENT ILLNESS:   Pawel Duque is a 44 year old female  who presents for scheduled abdominal myomectomy.    No new complaints today.      PAST MEDICAL HISTORY:   Past Medical History[1]     PAST SURGICAL HISTORY:   Past Surgical History[2]     PAST OB HISTORY:  OB History    Para Term  AB Living   0 0 0 0 0 0   SAB IAB Ectopic Multiple Live Births   0 0 0 0 0       CURRENT MEDICATIONS:    Medications - Current[3]    ALLERGIES:  Allergies[4]    SOCIAL HISTORY:  Social Hx on file[5]      FAMILY HISTORY:  Family History[6]  ASSESSMENTS:  PHYSICAL EXAM:   Patient's last menstrual period was 2025 (exact date).   Vitals:    25 0916 25 0607 25 0645   BP:  (!) 178/97 143/83   BP Location:  Right arm Left arm   Pulse:  58 61   Resp:  16    Temp:  98 °F (36.7 °C)    TempSrc:  Oral    SpO2:  97%    Weight: 138 lb (62.6 kg) 137 lb (62.1 kg)    Height: 59\"     CONSTITUTIONAL: Awake, alert, cooperative, no apparent distress, and appears stated age   NECK: Supple, symmetrical, trachea midline, no adenopathy, thyroid symmetric, not enlarged and no tenderness  LUNGS: No excess work of breathing, LCTAB  CV: RRR no murmurs  ABDOMEN: Soft, non-distended, non-tender, uterus palpable at umbilicus    GENITAL/URINARY:    Uterus nontender and enlarged    MUSCULOSKELETAL: There is no redness, warmth, or swelling of the joints. Tone is normal.  NEUROLOGIC: Patient is awake, alert and oriented to name, place and time. Casual gait is normal.  SKIN: no bruising or bleeding and no rashes  PSYCHIATRIC: Behavior:  Appropriate  Mood:  appropriate    Pelvic US: 24  Findings:   Left ovary   Length 3.34 cm   Width 3.45 cm   Height 2.21 cm   Volume 13.334     Right Ovary   Length 6.06 cm   Width 3.74 cm   Height 3.76 cm   Volume 44.620   Cyst: 3.9 x 3.3 cm     Uterus   Length 20.01 cm   Width 14.22  cm   Height 9.67 cm   Volume 1440.694   Endometrial Thickness 5.27 mm   Fibroid 1: 8.2 x 5.4 cm   Fibroid 2: 3.1 x 3.2 cm   Fibroid 3 fundal: 10.9 x 13.6 cm   Fibroid 4: 5.7 x 5.4 cm   Fibroid 5: 6.4 x 6.0 cm     Impression:   Uterus is enlarged with multiple fibroids seen. The 5 largest are measured, as noted above.   There is a simple cyst in the right ovary as measured above.   The left ovary appears normal.   ASSESSMENT AND PLAN:  44 year old  here for scheduled abdominal myomectomy.  - risks of surgery reviewed including pain, bleeding, infection, injury to surrounding structures, possible need for hysterectomy and/or oophorectomy. All questions answered, consent form signed and in chart  - proceed to OR when ready.     Plan for admission post-op    Sharri Leach DO         [1]   Past Medical History:   Disorder of thyroid    Fibroids    High blood pressure    HTN (hypertension)    Hx of motion sickness    Hypothyroid    Migraine    Migraines   [2]   Past Surgical History:  Procedure Laterality Date    Breast biopsy Left     Kelsey localization wire 1 site left (cpt=19281)      Prior myomectomy     [3] No current outpatient medications on file.  [4] No Known Allergies  [5]   Social History  Socioeconomic History    Marital status:    Tobacco Use    Smoking status: Former     Current packs/day: 0.00     Types: Cigarettes     Quit date: 2015     Years since quitting: 10.3    Smokeless tobacco: Never   Vaping Use    Vaping status: Never Used   Substance and Sexual Activity    Alcohol use: Yes     Comment: 1-2 times per week    Drug use: Not Currently    Sexual activity: Yes     Partners: Male   [6]   Family History  Problem Relation Age of Onset    Diabetes Mother         pre-diabetes    Other (prostate problems) Father     Heart Disease Sister     No Known Problems Sister     No Known Problems Brother

## 2025-05-21 NOTE — OPERATIVE REPORT
Operative Report     DATE OF PROCEDURE: 11/13/24     PRE-OP DIAGNOSIS:  SYMPTOMATIC UTERINE FIBROIDS, AUB-L, MENORRHAGIA WITH REGULAR CYCLE     POST-OP DIAGNOSIS: SAME         PROCEDURE(S):    Abdominal myomectomy     ANESTHESIA: General      SURGEON(S): Sharri Leach DO      ASSISTANTS: Paulino Bradley     ESTIMATED BLOOD LOSS: 2800 mL      URINE OUTPUT: 600 mL      TOTAL IV FLUIDS: 2500 mL + 2 units RBC     SPECIMENS: uterine fibroids     IMPLANTS: surgicel on uterine incisions     COMPLICATIONS: None      Findings:  Enlarged bulky, multifbroid uterus. Normal left fallopian tube and ovary. Right fallopian tube and ovary densely adherent to uterus. Omental adhesion to posterior uterus. Anterior uterus adherent to anterior abdominal wall.  29 fibroids removed. No palpable fibroids remaining in uterine body. Excellent hemostasis noted at end of surgery. All specimens collected and sent to pathology.      Procedure Details:   After informed consent was obtained, the patient was taken to the operating room. She was then placed in the supine position and general anesthesia was initiated with endotracheal intubation. A time out procedure was completed. 2 grams of Ancef were given. A urinary nguyen catheter was placed. The abdomen was then prepped and draped in a normal sterile fashion.      A pfannenstiel incision was made with scalpel down through the subcutaneous tissue. The fascia was incised and extended laterally with forceps and devlin scissors. The fascia was then  anterior and posterior using Kocher clamps and devlin scissors. The midline was identified. The peritoneum was elevated and entered using metzenbaum scissors. The peritoneal cavity was then confirmed to be entered. The uterus was palpated and noted to be enlarged and bulky. The bowel was packed into the upper abdomen using moist sponges.  A survey of the pelvis was performed with findings noted above.   The anterior abdominal wall adhesion was  lysed using electrocautery. The posterior omental adhesion was lysed using electrocautery.    The uterus was delivered through the abdominal incision.  The first incision was made in the serosa over the fundus after infiltration with dilute vasopressin. The incision was carried down to the fibroid capsule. The fibroid was carefully dissected out and collected to be sent to pathology.  Multiple fibroids were removed through this same serosal incision. Another serosal incision was made in the posterior uterine serosa after infiltration with vasopressin. Multiple fibroids were removed through this incision also. The same steps were repeat on the anterior aspect of the uterus until no more fibroids were palpable.    At this time, significant blood loss was noted of over 1 liter in the suction canister. 2 units of RBC were given.    Each uterine incision was closed in 2-3 deep layers with 0-vicryl. The serosal incisions were closed with 2-0 vicryl in a baseball stitch fashion. Some continued bleeding was noted. 1 gram of tranexamic acid was given.    Warm irrigation was then placed in pelvis. The uterine incisions were inspected again. Surgicel was placed over the incisions and the uterus was placed inside the abdominal cavity.       The fascia was closed with 0 vicryl suture. The subcutaneous layer was irrigated and bleeding controlled with electrocautery. The subcutaneous layer was re-approximated with interrupted 2-0 plain sutures. The skin was then repaired with 4-0 monocryl. The abdomen was cleaned. Dermabond was placed on the skin and tegaderm was placed over this.      All sponge, lap, and instrument and needle counts were correct times two. The patient tolerated the procedure well and was taken to recovery room in stable condition.         DISPOSITION: awakened from anesthesia, extubated and taken to the recovery room in a stable condition, having suffered no apparent untoward event.      Sharri Leach DO

## 2025-05-21 NOTE — ANESTHESIA PROCEDURE NOTES
Airway  Date/Time: 5/21/2025 7:35 AM  Reason: Elective      General Information and Staff   Patient location during procedure: OR  Anesthesiologist: Michelle Lozada MD  Performed: anesthesiologist   Performed by: Michelle Lozada MD  Authorized by: Michelle Lozada MD        Indications and Patient Condition  Indications for airway management: anesthesia  Sedation level: deep      Preoxygenated: yesPatient position: sniffing    Mask difficulty assessment: 1 - vent by mask    Final Airway Details    Final airway type: endotracheal airway    Successful airway: ETT  Cuffed: yes   Successful intubation technique: direct laryngoscopy  Endotracheal tube insertion site: oral  Blade: [place]  Blade size: #3  ETT size (mm): 7.0    Cormack-Lehane Classification: grade I - full view of glottis  Placement verified by: capnometry   Measured from: teeth  ETT to teeth (cm): 21  Number of attempts at approach: 1

## 2025-05-22 ENCOUNTER — TELEPHONE (OUTPATIENT)
Dept: FAMILY MEDICINE CLINIC | Facility: CLINIC | Age: 45
End: 2025-05-22

## 2025-05-22 LAB
ANION GAP SERPL CALC-SCNC: 8 MMOL/L (ref 0–18)
BASOPHILS # BLD AUTO: 0.03 X10(3) UL (ref 0–0.2)
BASOPHILS # BLD AUTO: 0.04 X10(3) UL (ref 0–0.2)
BASOPHILS NFR BLD AUTO: 0.2 %
BASOPHILS NFR BLD AUTO: 0.3 %
BLOOD TYPE BARCODE: 9500
BUN BLD-MCNC: 9 MG/DL (ref 9–23)
BUN/CREAT SERPL: 18.4 (ref 10–20)
CALCIUM BLD-MCNC: 6.8 MG/DL (ref 8.7–10.4)
CHLORIDE SERPL-SCNC: 109 MMOL/L (ref 98–112)
CO2 SERPL-SCNC: 24 MMOL/L (ref 21–32)
CREAT BLD-MCNC: 0.49 MG/DL (ref 0.55–1.02)
DEPRECATED RDW RBC AUTO: 49.1 FL (ref 35.1–46.3)
DEPRECATED RDW RBC AUTO: 50.1 FL (ref 35.1–46.3)
EGFRCR SERPLBLD CKD-EPI 2021: 119 ML/MIN/1.73M2 (ref 60–?)
EOSINOPHIL # BLD AUTO: 0.02 X10(3) UL (ref 0–0.7)
EOSINOPHIL # BLD AUTO: 0.08 X10(3) UL (ref 0–0.7)
EOSINOPHIL NFR BLD AUTO: 0.1 %
EOSINOPHIL NFR BLD AUTO: 0.6 %
ERYTHROCYTE [DISTWIDTH] IN BLOOD BY AUTOMATED COUNT: 14.9 % (ref 11–15)
ERYTHROCYTE [DISTWIDTH] IN BLOOD BY AUTOMATED COUNT: 15 % (ref 11–15)
GLUCOSE BLD-MCNC: 111 MG/DL (ref 70–99)
HCT VFR BLD AUTO: 22.2 % (ref 35–48)
HCT VFR BLD AUTO: 24.2 % (ref 35–48)
HGB BLD-MCNC: 7.7 G/DL (ref 12–16)
HGB BLD-MCNC: 8.2 G/DL (ref 12–16)
IMM GRANULOCYTES # BLD AUTO: 0.08 X10(3) UL (ref 0–1)
IMM GRANULOCYTES # BLD AUTO: 0.1 X10(3) UL (ref 0–1)
IMM GRANULOCYTES NFR BLD: 0.6 %
IMM GRANULOCYTES NFR BLD: 0.8 %
LYMPHOCYTES # BLD AUTO: 1.72 X10(3) UL (ref 1–4)
LYMPHOCYTES # BLD AUTO: 2.2 X10(3) UL (ref 1–4)
LYMPHOCYTES NFR BLD AUTO: 12.8 %
LYMPHOCYTES NFR BLD AUTO: 16.8 %
MCH RBC QN AUTO: 30.8 PG (ref 26–34)
MCH RBC QN AUTO: 31.3 PG (ref 26–34)
MCHC RBC AUTO-ENTMCNC: 33.9 G/DL (ref 31–37)
MCHC RBC AUTO-ENTMCNC: 34.7 G/DL (ref 31–37)
MCV RBC AUTO: 90.2 FL (ref 80–100)
MCV RBC AUTO: 91 FL (ref 80–100)
MONOCYTES # BLD AUTO: 1.18 X10(3) UL (ref 0.1–1)
MONOCYTES # BLD AUTO: 1.48 X10(3) UL (ref 0.1–1)
MONOCYTES NFR BLD AUTO: 11 %
MONOCYTES NFR BLD AUTO: 9 %
NEUTROPHILS # BLD AUTO: 10.1 X10 (3) UL (ref 1.5–7.7)
NEUTROPHILS # BLD AUTO: 10.1 X10(3) UL (ref 1.5–7.7)
NEUTROPHILS # BLD AUTO: 9.52 X10 (3) UL (ref 1.5–7.7)
NEUTROPHILS # BLD AUTO: 9.52 X10(3) UL (ref 1.5–7.7)
NEUTROPHILS NFR BLD AUTO: 72.5 %
NEUTROPHILS NFR BLD AUTO: 75.3 %
OSMOLALITY SERPL CALC.SUM OF ELEC: 291 MOSM/KG (ref 275–295)
PLATELET # BLD AUTO: 211 10(3)UL (ref 150–450)
PLATELET # BLD AUTO: 225 10(3)UL (ref 150–450)
POTASSIUM SERPL-SCNC: 3.3 MMOL/L (ref 3.5–5.1)
RBC # BLD AUTO: 2.46 X10(6)UL (ref 3.8–5.3)
RBC # BLD AUTO: 2.66 X10(6)UL (ref 3.8–5.3)
SODIUM SERPL-SCNC: 141 MMOL/L (ref 136–145)
UNIT VOLUME: 350 ML
WBC # BLD AUTO: 13.1 X10(3) UL (ref 4–11)
WBC # BLD AUTO: 13.4 X10(3) UL (ref 4–11)

## 2025-05-22 RX ORDER — GABAPENTIN 300 MG/1
300 CAPSULE ORAL EVERY 8 HOURS
Status: DISCONTINUED | OUTPATIENT
Start: 2025-05-22 | End: 2025-05-23

## 2025-05-22 RX ORDER — METOPROLOL SUCCINATE 25 MG/1
25 TABLET, EXTENDED RELEASE ORAL
Status: DISCONTINUED | OUTPATIENT
Start: 2025-05-22 | End: 2025-05-23

## 2025-05-22 RX ORDER — KETOCONAZOLE 20 MG/G
1 CREAM TOPICAL 2 TIMES DAILY
Qty: 60 G | Refills: 1 | Status: SHIPPED | OUTPATIENT
Start: 2025-05-22 | End: 2025-07-03

## 2025-05-22 RX ORDER — LEVOTHYROXINE SODIUM 50 UG/1
50 TABLET ORAL EVERY MORNING
Status: DISCONTINUED | OUTPATIENT
Start: 2025-05-22 | End: 2025-05-23

## 2025-05-22 NOTE — PLAN OF CARE
Problem: Patient Centered Care  Goal: Patient preferences are identified and integrated in the patient's plan of care  Description: Interventions:  - What would you like us to know as we care for you?   - Provide timely, complete, and accurate information to patient/family  - Incorporate patient and family knowledge, values, beliefs, and cultural backgrounds into the planning and delivery of care  - Encourage patient/family to participate in care and decision-making at the level they choose  - Honor patient and family perspectives and choices  Outcome: Progressing     Problem: Patient/Family Goals  Goal: Patient/Family Long Term Goal  Description: Patient's Long Term Goal:     Interventions:  -   - See additional Care Plan goals for specific interventions  Outcome: Progressing  Goal: Patient/Family Short Term Goal  Description: Patient's Short Term Goal:     Interventions:   -   - See additional Care Plan goals for specific interventions  Outcome: Progressing     Problem: GENITOURINARY - ADULT  Goal: Absence of urinary retention  Description: INTERVENTIONS:  - Assess patient’s ability to void and empty bladder  - Monitor intake/output and perform bladder scan as needed  - Follow urinary retention protocol/standard of care  - Consider collaborating with pharmacy to review patient's medication profile  - Implement strategies to promote bladder emptying  Outcome: Progressing     Problem: PAIN - ADULT  Goal: Verbalizes/displays adequate comfort level or patient's stated pain goal  Description: INTERVENTIONS:  - Encourage pt to monitor pain and request assistance  - Assess pain using appropriate pain scale  - Administer analgesics based on type and severity of pain and evaluate response  - Implement non-pharmacological measures as appropriate and evaluate response  - Consider cultural and social influences on pain and pain management  - Manage/alleviate anxiety  - Utilize distraction and/or relaxation techniques  -  Monitor for opioid side effects  - Notify MD/LIP if interventions unsuccessful or patient reports new pain  - Anticipate increased pain with activity and pre-medicate as appropriate  Outcome: Progressing     Problem: RISK FOR INFECTION - ADULT  Goal: Absence of fever/infection during anticipated neutropenic period  Description: INTERVENTIONS  - Monitor WBC  - Administer growth factors as ordered  - Implement neutropenic guidelines  Outcome: Progressing     Problem: SAFETY ADULT - FALL  Goal: Free from fall injury  Description: INTERVENTIONS:  - Assess pt frequently for physical needs  - Identify cognitive and physical deficits and behaviors that affect risk of falls.  - [place] fall precautions as indicated by assessment.  - Educate pt/family on patient safety including physical limitations  - Instruct pt to call for assistance with activity based on assessment  - Modify environment to reduce risk of injury  - Provide assistive devices as appropriate  - Consider OT/PT consult to assist with strengthening/mobility  - Encourage toileting schedule  Outcome: Progressing     Problem: GASTROINTESTINAL - ADULT  Goal: Minimal or absence of nausea and vomiting  Description: INTERVENTIONS:  - Maintain adequate hydration with IV or PO as ordered and tolerated  - Nasogastric tube to low intermittent suction as ordered  - Evaluate effectiveness of ordered antiemetic medications  - Provide nonpharmacologic comfort measures as appropriate  - Advance diet as tolerated, if ordered  - Obtain nutritional consult as needed  - Evaluate fluid balance  Outcome: Progressing  Goal: Maintains or returns to baseline bowel function  Description: INTERVENTIONS:  - Assess bowel function  - Maintain adequate hydration with IV or PO as ordered and tolerated  - Evaluate effectiveness of GI medications  - Encourage mobilization and activity  - Obtain nutritional consult as needed  - Establish a toileting routine/schedule  - Consider collaborating  with pharmacy to review patient's medication profile  Outcome: Progressing  Goal: Maintains adequate nutritional intake (undernourished)  Description: INTERVENTIONS:  - Monitor percentage of each meal consumed  - Identify factors contributing to decreased intake, treat as appropriate  - Assist with meals as needed  - Monitor I&O, WT and lab values  - Obtain nutritional consult as needed  - Optimize oral hygiene and moisture  - Encourage food from home; allow for food preferences  - Enhance eating environment  Outcome: Progressing     Problem: SKIN/TISSUE INTEGRITY - ADULT  Goal: Skin integrity remains intact  Description: INTERVENTIONS  - Assess and document risk factors for pressure ulcer development  - Assess and document skin integrity  - Monitor for areas of redness and/or skin breakdown  - Initiate interventions, skin care algorithm/standards of care as needed  Outcome: Progressing  Goal: Incision(s), wounds(s) or drain site(s) healing without S/S of infection  Description: INTERVENTIONS:  - Assess and document risk factors for pressure ulcer development  - Assess and document skin integrity  - Assess and document dressing/incision, wound bed, drain sites and surrounding tissue  - Implement wound care per orders  - Initiate isolation precautions as appropriate  - Initiate Pressure Ulcer prevention bundle as indicated  Outcome: Progressing   Patient is drowsy. Easily awakens. Pain managed with PCA dilaudid and scheduled Toradol. Patient is nauseous and had one episode of emesis improved with Zofran. Patient has poor appetite. IV fluids infusing. Surgical dressing clean, dry and intact. Hector in place. Lovenox and SCD's for VTE prophylaxis. Plan of care discussed.

## 2025-05-22 NOTE — PROGRESS NOTES
[facility]  Obstetrics and Gynecology   Post Op Progress Note    Pawel Duque Patient Status:  Inpatient    1980 MRN [id number]   Location [facility] 4W/SW/SE Attending Sharri Leach, DO   Hospital Day 1 PCP Fadia Sheldon MD       Subjective:     Pawel Duque is a 44 year old  female who was admitted on 2025 s/p abdominal myomectomy. The patient reports feeling ok this morning. Pain is well controlled on PCA and toradol.  Denies fever, chills, N, V, chest pain and SOB. Bleeding has been stable. She reports small amount of blood when nurse doing catheter care. Nguyen till in place. Passing flatus.   Bm. Tolerating general diet this morning. Ambulating without difficulty.     Objective:     Vitals:    25 0411   BP: 93/60   Pulse: 80   Resp: 16   Temp: 98.8 °F (37.1 °C)       General: AAO. NAD.    CVS exam: RRR   Chest: CTAB   Abdominal exam: soft, nontender, nondistended, no rebound or guarding. +BS.   Incision: clean, dry, intact without signs of infection. Tegaderm in place.    Ext: non-tender, no edema     Labs:  Recent Labs   Lab 25  1106 25  1047 25  1852 25  0631   RBC 4.72 3.76*  --  2.46*   HGB 14.2 11.5* 8.9* 7.7*   HCT 43.1 34.2* 25.5* 22.2*   MCV 91.3 91.0  --  90.2   MCH 30.1 30.6  --  31.3   MCHC 32.9 33.6  --  34.7   RDW 12.7 14.3  --  14.9   NEPRELIM 6.60  --   --  10.10*   WBC 9.3 21.9*  --  13.4*   .0 266.0  --  211.0             Assessment:     Pawel Duque is a 44 year old  female who was admitted on 2025 s/p abdominal myomectomy    Problem List[1]      Plan:     Postoperative exam   - continue postoperative care  - FEN: continue LR at 100 ml/hr until adequate void  - CV: continue vitals per routine  - Pulmonary: continue IS  - GI: cont regular diet  - : discontinue nguyen catheter. Follow up void. Monitoring for bleeding.   - Neuro: discontinue IV pain medication. Continue oral  pain medication ibuprofen/tylenol, start gabapentin 300 mg q 8 hrs.  - Heme: repeat cbc this afternoon 1600   - DVT: continue SCD, Lovenox daily, encourage ambulation. Activity as tolerated   - home meds ordered - levothyroxine and metoprolol.  - Disposition: routine post-op care. Likely discharge home tomorrow.    All of the findings and plan were discussed with the patient.  She notes understanding and agrees with the plan of care.  All questions were answered to the best of my ability at this time.    Sharri Leach DO          [1]   Patient Active Problem List  Diagnosis    Migraine without aura and without status migrainosus, not intractable    Hypothyroidism, acquired    Uterine leiomyoma    Hypertension    Post-operative state    Pre-op testing

## 2025-05-22 NOTE — TELEPHONE ENCOUNTER
Recommend 1 part white vinegar and 4-10 parts water soaks for 10 minutes twice a day.  Follow with ketoconazole cream.  Do for 6 weeks.      Called patient, no answer.     Left message that this RN would call back tomorrow with results.

## 2025-05-22 NOTE — PLAN OF CARE
Pt a/o x 4, vss. Hector catheter removed, pt voiding freely. Medicated per md order, discontinued PCA pump. Pt ambulates x 1 standby, instructed pt to use call light for assistance, call light and belongings with in reach.   Problem: Patient Centered Care  Goal: Patient preferences are identified and integrated in the patient's plan of care  Description: Interventions:  - What would you like us to know as we care for you?   - Provide timely, complete, and accurate information to patient/family  - Incorporate patient and family knowledge, values, beliefs, and cultural backgrounds into the planning and delivery of care  - Encourage patient/family to participate in care and decision-making at the level they choose  - Honor patient and family perspectives and choices  Outcome: Progressing     Problem: Patient/Family Goals  Goal: Patient/Family Long Term Goal  Description: Patient's Long Term Goal:     Interventions:  -   - See additional Care Plan goals for specific interventions  Outcome: Progressing  Goal: Patient/Family Short Term Goal  Description: Patient's Short Term Goal:     Interventions:   -   - See additional Care Plan goals for specific interventions  Outcome: Progressing     Problem: GENITOURINARY - ADULT  Goal: Absence of urinary retention  Description: INTERVENTIONS:  - Assess patient’s ability to void and empty bladder  - Monitor intake/output and perform bladder scan as needed  - Follow urinary retention protocol/standard of care  - Consider collaborating with pharmacy to review patient's medication profile  - Implement strategies to promote bladder emptying  Outcome: Progressing     Problem: PAIN - ADULT  Goal: Verbalizes/displays adequate comfort level or patient's stated pain goal  Description: INTERVENTIONS:  - Encourage pt to monitor pain and request assistance  - Assess pain using appropriate pain scale  - Administer analgesics based on type and severity of pain and evaluate response  - Implement  non-pharmacological measures as appropriate and evaluate response  - Consider cultural and social influences on pain and pain management  - Manage/alleviate anxiety  - Utilize distraction and/or relaxation techniques  - Monitor for opioid side effects  - Notify MD/LIP if interventions unsuccessful or patient reports new pain  - Anticipate increased pain with activity and pre-medicate as appropriate  Outcome: Progressing     Problem: RISK FOR INFECTION - ADULT  Goal: Absence of fever/infection during anticipated neutropenic period  Description: INTERVENTIONS  - Monitor WBC  - Administer growth factors as ordered  - Implement neutropenic guidelines  Outcome: Progressing     Problem: SAFETY ADULT - FALL  Goal: Free from fall injury  Description: INTERVENTIONS:  - Assess pt frequently for physical needs  - Identify cognitive and physical deficits and behaviors that affect risk of falls.  - [place] fall precautions as indicated by assessment.  - Educate pt/family on patient safety including physical limitations  - Instruct pt to call for assistance with activity based on assessment  - Modify environment to reduce risk of injury  - Provide assistive devices as appropriate  - Consider OT/PT consult to assist with strengthening/mobility  - Encourage toileting schedule  Outcome: Progressing     Problem: GASTROINTESTINAL - ADULT  Goal: Minimal or absence of nausea and vomiting  Description: INTERVENTIONS:  - Maintain adequate hydration with IV or PO as ordered and tolerated  - Nasogastric tube to low intermittent suction as ordered  - Evaluate effectiveness of ordered antiemetic medications  - Provide nonpharmacologic comfort measures as appropriate  - Advance diet as tolerated, if ordered  - Obtain nutritional consult as needed  - Evaluate fluid balance  Outcome: Progressing  Goal: Maintains or returns to baseline bowel function  Description: INTERVENTIONS:  - Assess bowel function  - Maintain adequate hydration with IV or  PO as ordered and tolerated  - Evaluate effectiveness of GI medications  - Encourage mobilization and activity  - Obtain nutritional consult as needed  - Establish a toileting routine/schedule  - Consider collaborating with pharmacy to review patient's medication profile  Outcome: Progressing  Goal: Maintains adequate nutritional intake (undernourished)  Description: INTERVENTIONS:  - Monitor percentage of each meal consumed  - Identify factors contributing to decreased intake, treat as appropriate  - Assist with meals as needed  - Monitor I&O, WT and lab values  - Obtain nutritional consult as needed  - Optimize oral hygiene and moisture  - Encourage food from home; allow for food preferences  - Enhance eating environment  Outcome: Progressing     Problem: SKIN/TISSUE INTEGRITY - ADULT  Goal: Skin integrity remains intact  Description: INTERVENTIONS  - Assess and document risk factors for pressure ulcer development  - Assess and document skin integrity  - Monitor for areas of redness and/or skin breakdown  - Initiate interventions, skin care algorithm/standards of care as needed  Outcome: Progressing  Goal: Incision(s), wounds(s) or drain site(s) healing without S/S of infection  Description: INTERVENTIONS:  - Assess and document risk factors for pressure ulcer development  - Assess and document skin integrity  - Assess and document dressing/incision, wound bed, drain sites and surrounding tissue  - Implement wound care per orders  - Initiate isolation precautions as appropriate  - Initiate Pressure Ulcer prevention bundle as indicated  Outcome: Progressing

## 2025-05-23 VITALS
WEIGHT: 137 LBS | HEIGHT: 59 IN | HEART RATE: 94 BPM | DIASTOLIC BLOOD PRESSURE: 78 MMHG | RESPIRATION RATE: 18 BRPM | BODY MASS INDEX: 27.62 KG/M2 | SYSTOLIC BLOOD PRESSURE: 120 MMHG | TEMPERATURE: 99 F | OXYGEN SATURATION: 97 %

## 2025-05-23 PROBLEM — D62 ACUTE BLOOD LOSS ANEMIA: Status: ACTIVE | Noted: 2025-05-23

## 2025-05-23 LAB
DEPRECATED RDW RBC AUTO: 49.1 FL (ref 35.1–46.3)
ERYTHROCYTE [DISTWIDTH] IN BLOOD BY AUTOMATED COUNT: 14.8 % (ref 11–15)
HCT VFR BLD AUTO: 22.9 % (ref 35–48)
HGB BLD-MCNC: 7.7 G/DL (ref 12–16)
MCH RBC QN AUTO: 30.6 PG (ref 26–34)
MCHC RBC AUTO-ENTMCNC: 33.6 G/DL (ref 31–37)
MCV RBC AUTO: 90.9 FL (ref 80–100)
PLATELET # BLD AUTO: 231 10(3)UL (ref 150–450)
RBC # BLD AUTO: 2.52 X10(6)UL (ref 3.8–5.3)
WBC # BLD AUTO: 10.2 X10(3) UL (ref 4–11)

## 2025-05-23 RX ORDER — DOCUSATE SODIUM 100 MG/1
100 CAPSULE, LIQUID FILLED ORAL 2 TIMES DAILY PRN
Qty: 60 CAPSULE | Refills: 1 | Status: SHIPPED | OUTPATIENT
Start: 2025-05-23

## 2025-05-23 RX ORDER — FERROUS SULFATE 325(65) MG
325 TABLET, DELAYED RELEASE (ENTERIC COATED) ORAL 2 TIMES DAILY WITH MEALS
Qty: 60 TABLET | Refills: 1 | Status: SHIPPED | OUTPATIENT
Start: 2025-05-23

## 2025-05-23 RX ORDER — GABAPENTIN 300 MG/1
300 CAPSULE ORAL EVERY 8 HOURS PRN
Qty: 30 CAPSULE | Refills: 0 | Status: SHIPPED | OUTPATIENT
Start: 2025-05-23

## 2025-05-23 RX ORDER — IBUPROFEN 600 MG/1
600 TABLET, FILM COATED ORAL EVERY 6 HOURS PRN
Qty: 60 TABLET | Refills: 1 | Status: SHIPPED | OUTPATIENT
Start: 2025-05-23

## 2025-05-23 NOTE — PLAN OF CARE
Problem: PAIN - ADULT  Goal: Verbalizes/displays adequate comfort level or patient's stated pain goal  Description: INTERVENTIONS:  - Encourage pt to monitor pain and request assistance  - Assess pain using appropriate pain scale  - Administer analgesics based on type and severity of pain and evaluate response  - Implement non-pharmacological measures as appropriate and evaluate response  - Consider cultural and social influences on pain and pain management  - Manage/alleviate anxiety  - Utilize distraction and/or relaxation techniques  - Monitor for opioid side effects  - Notify MD/LIP if interventions unsuccessful or patient reports new pain  - Anticipate increased pain with activity and pre-medicate as appropriate  Outcome: Progressing     Problem: SAFETY ADULT - FALL  Goal: Free from fall injury  Description: INTERVENTIONS:  - Assess pt frequently for physical needs  - Identify cognitive and physical deficits and behaviors that affect risk of falls.  - [place] fall precautions as indicated by assessment.  - Educate pt/family on patient safety including physical limitations  - Instruct pt to call for assistance with activity based on assessment  - Modify environment to reduce risk of injury  - Provide assistive devices as appropriate  - Consider OT/PT consult to assist with strengthening/mobility  - Encourage toileting schedule  Outcome: Progressing     Problem: GASTROINTESTINAL - ADULT  Goal: Minimal or absence of nausea and vomiting  Description: INTERVENTIONS:  - Maintain adequate hydration with IV or PO as ordered and tolerated  - Nasogastric tube to low intermittent suction as ordered  - Evaluate effectiveness of ordered antiemetic medications  - Provide nonpharmacologic comfort measures as appropriate  - Advance diet as tolerated, if ordered  - Obtain nutritional consult as needed  - Evaluate fluid balance  Outcome: Progressing

## 2025-05-23 NOTE — DISCHARGE SUMMARY
[facility]  part of [facility]    Discharge Summary    Pawel Duque Patient Status:  Inpatient    1980 MRN [id number]   Location [facility] 4W/SW/SE Attending Sharri Leach DO   Hosp Day # 2 PCP Fadia Sheldon MD     Admit Date: 2025    Discharge Date :     Attending Physician: Sharri Leach DO    Reason for Admission:  S/p abdominal myomectomy  Uterine fibroids    Procedures:  myomectomy    Hospital Course: patient was admitted for post-op management after abdmoinal myomectomy on 25. During surgery there was acute blood loss of 2800 mL, requiring 2 units of RBC transfusion intra-op. Her hemoglobin remained stable post-op with no further signs of ongoing hemorrhage and no need for any more blood products. Pain we well controlled with PCA then with PO pain medications. She reached all expected post-op milestones on post-op day 2 and was discharged home.    Discharge Diagnoses:   Post-operative state  Uterine fibroids  Acute blood loss anemia    Discharged Condition: good    Disposition: home    Patient Instructions:  Follow-up appointment with Dr. Leach in 2 weeks.    Sharri Leach DO  2025  8:36 AM

## 2025-05-23 NOTE — DISCHARGE INSTRUCTIONS
Post Operative Home Care Instructions     We hope you were pleased with your care at [facility].  We wish you the best outcome and overall experience.  These instructions will help to minimize pain, limit the risk for an infection, and improve the likelihood of a successful recovery.    What to Expect:  Abdominal cramping   Vaginal bleeding for about 4-6 weeks   Constipation is common after surgery. Please keep up with Colace twice per day and Miralax as needed.     Prescribed medication:  - prescriptions for gabapentin and ibuprofen were sent to your preferred pharmacy.   - take the gabapentin every 8 hours for the next 3-4 days, then you can back off and take only as needed for the rest of the week.  - take the ibuprofen every 8 hours for the next 3-4 days (alternate with the gabapentin), then you can back off and take only as needed for pain while you continue healing.      Over-The-Counter Medication  Non-prescription anti-inflammatory medications can also help to ease the pain.  You may take Tylenol if you need something in addition to the prescribed medications.  Colace or Metamucil for Constipation  Drink a full glass of water with oral medication and take as directed.    Wound Care  The following instructions will promote proper healing and help to prevent infection  Please use soap and water over incision   Pat your incision dry and leave open to air if possible   There are stitches below your skin and glue over your skin.  There is a clear sticker over your incision. You can remove the sticker at 7 days after your surgery if you want. It is ok to leave it on until your incision-check appointment.    Bathing/Showers  You may resume showers  No baths, swimming, hot tubs for at least 8 weeks.     Home Medication  Resume your home medications as instructed    Diet   Resume your normal diet    Activity  Refrain from vaginal intercourse, vaginal suppositories, tampon use or douches until after  your follow up visit   No exercising for 4 weeks  You may climb stairs minimally for the 1st week.    Do not do heavy housework for at least 2-3 weeks    Return to Work or School  You may return to work in 6-8 weeks  Contact your gynecologist's office, if you need a medical release. ([phone])    Driving  Avoid driving for 1-2 weeks or sooner if not taking narcotics. No driving while taking narcotics.     Follow-up Appointment with Your Obstetrician  Call your Gynecologist's office today for an incision check appointment for 2 weeks post-surgery.   The number is [phone].  Verify your appointment date, day, time, and location.  At your office visit:  Your progress will be evaluated, pathology will be reviewed, and any additional concerns and instructions will be discussed.    Questions or Concerns  Call your gynecologist's office if you experience the following:  Severe pain not controlled by pain medication  Foul smelling vaginal discharge  Heavy bleeding  Shortness of breath  Fever  Redness, increased swelling or drainage from your incision  Crying and periods of sadness that prevents you from caring for yourself   Burning sensation during urination or inability to urinate  Swelling, redness or abnormal warmth to your leg/calf  Please call [phone]. If your call is made after office hours, a physician will be available to help you.  There is always a provider covering our patients.    Thank you for coming to [facility] for your surgery.  The nurses, gynecologist, and the anesthesiologists try very hard to make sure you receive the best care possible.  Your trust in them as well as us is greatly appreciated.    Thanks so much,   The [facility] Obstetrics and Gynecology

## 2025-05-23 NOTE — PROGRESS NOTES
[facility]  Obstetrics and Gynecology   Post Op Progress Note    Pawel Duque Patient Status:  Inpatient    1980 MRN [id number]   Location [facility] 4W/SW/SE Attending Sharri Leach, DO   Hospital Day 2 PCP Fadia Sheldon MD       Subjective:     Pawel Duque is a 44 year old  female who was admitted on 2025 s/p abdominal myomectomy.     The patient reports pain is well controlled on po pain medications.  Denies fever, chills, N, V, chest pain and SOB. Bleeding has been minimal. Voiding well. Passing flatus. No Bm. Tolerating general diet. Ambulating well.     Objective:     Vitals:    25 1126 25 1943 25 0326 25 0522   BP: 114/78 122/85 111/64 100/64   BP Location: Right arm Right arm Right arm Right arm   Pulse: 88 96 87 89   Resp: 18 18 19    Temp: 98.6 °F (37 °C) 98.3 °F (36.8 °C) 99.2 °F (37.3 °C)    TempSrc: Oral Oral Oral    SpO2: 98% 98% 93% 95%   Weight:       Height:         General: AAO. NAD.    CVS exam: RRR   Chest: CTAB   Abdominal exam: soft, nontender, nondistended, no rebound or guarding. +BS.   Incision: clean, dry, intact without signs of infection. Tegaderm in place.    Ext: non-tender, no edema     Labs:  Recent Labs   Lab 25  1106 25  1047 25  0631 25  1623 25  0601   RBC 4.72   < > 2.46* 2.66* 2.52*   HGB 14.2   < > 7.7* 8.2* 7.7*   HCT 43.1   < > 22.2* 24.2* 22.9*   MCV 91.3   < > 90.2 91.0 90.9   MCH 30.1   < > 31.3 30.8 30.6   MCHC 32.9   < > 34.7 33.9 33.6   RDW 12.7   < > 14.9 15.0 14.8   NEPRELIM 6.60  --  10.10* 9.52*  --    WBC 9.3   < > 13.4* 13.1* 10.2   .0   < > 211.0 225.0 231.0    < > = values in this interval not displayed.       Assessment:     Pawel Duque is a 44 year old  female who was admitted on 2025 s/p abdominal myomectomy    Problem List[1]      Plan:     Postoperative exam   - continue postoperative care  - CV: continue vitals  per routine  - Pulmonary: continue IS  - GI: cont regular diet  - Neuro: Continue oral pain medication ibuprofen/tylenol, gabapentin  - Heme: high blood loss during surgery, got 2 units RBC; hgb stable this morning.  - DVT: continue SCD, Lovenox daily, encourage ambulation. Activity as tolerated   - home meds ordered - levothyroxine and metoprolol.  - Disposition: Stable and cleared for discharge home today.    Acute blood loss anemia  - got 2 units RBC in OR due to high blood loss intra-op (2800 mL)  - hgb has been stable with stable vital signs in the post-operative setting  - will send home with PO iron.    All of the findings and plan were discussed with the patient.  She notes understanding and agrees with the plan of care.  All questions were answered to the best of my ability at this time.    Sharri Leach DO            [1]   Patient Active Problem List  Diagnosis    Migraine without aura and without status migrainosus, not intractable    Hypothyroidism, acquired    Uterine leiomyoma    Hypertension    Post-operative state    Pre-op testing    Acute blood loss anemia

## 2025-05-24 LAB
BLOOD TYPE BARCODE: 5100
UNIT VOLUME: 350 ML

## 2025-05-28 ENCOUNTER — PATIENT OUTREACH (OUTPATIENT)
Dept: CASE MANAGEMENT | Age: 45
End: 2025-05-28

## 2025-05-28 NOTE — PROGRESS NOTES
Hospital Follow up for PCP (Discharge 5/23 [facility])     PCP  Fadia Sheldon  [facility]  [address] [place] [id number]  [phone]  Attempt #1:  Left message on voicemail for patient to call transitions specialist back to schedule follow up appointments. Provided Transitions specialist scheduling phone number [phone].

## 2025-05-28 NOTE — PAYOR COMM NOTE
Sending clinical to extend IP auth.    --------------  CONTINUED STAY REVIEW    Payor: [employer] PPO  Subscriber #:  [id number]  Authorization Number: [id number]    Admit date: 25  Admit time:  5:51 AM    REVIEW DOCUMENTATION:   OP Report      Abdominal myomectomy      ESTIMATED BLOOD LOSS: 2800 mL      URINE OUTPUT: 600 mL      TOTAL IV FLUIDS: 2500 mL + 2 units RBC     SPECIMENS: uterine fibroids     IMPLANTS: surgicel on uterine incisions               Post Op Progress Note     Pawel Duque is a 44 year old  female who was admitted on 2025 s/p abdominal myomectomy. The patient reports feeling ok this morning. Pain is well controlled on PCA and toradol.  Denies fever, chills, N, V, chest pain and SOB. Bleeding has been stable. She reports small amount of blood when nurse doing catheter care. Hector till in place. Passing flatus.   Bm. Tolerating general diet this morning. Ambulating without difficulty.      Objective:          Vitals:     25 0411   BP: 93/60   Pulse: 80   Resp: 16   Temp: 98.8 °F (37.1 °C)         General: AAO. NAD.    CVS exam: RRR   Chest: CTAB   Abdominal exam: soft, nontender, nondistended, no rebound or guarding. +BS.   Incision: clean, dry, intact without signs of infection. Tegaderm in place.    Ext: non-tender, no edema     Labs:         Recent Labs   Lab 25  1106 25  1047 25  1852 25  0631   RBC 4.72 3.76*  --  2.46*   HGB 14.2 11.5* 8.9* 7.7*   HCT 43.1 34.2* 25.5* 22.2*   MCV 91.3 91.0  --  90.2   MCH 30.1 30.6  --  31.3   MCHC 32.9 33.6  --  34.7   RDW 12.7 14.3  --  14.9   NEPRELIM 6.60  --   --  10.10*   WBC 9.3 21.9*  --  13.4*   .0 266.0  --  211.0                  Assessment:      Pawel Duque is a 44 year old  female who was admitted on 2025 s/p abdominal myomectomy     [Problem List]    [Problem List]  Patient Active Problem List      Diagnosis    Migraine without aura and without  status migrainosus, not intractable    Hypothyroidism, acquired    Uterine leiomyoma    Hypertension    Post-operative state    Pre-op testing           Plan:      Postoperative exam   - continue postoperative care  - FEN: continue LR at 100 ml/hr until adequate void  - CV: continue vitals per routine  - Pulmonary: continue IS  - GI: cont regular diet  - : discontinue nguyen catheter. Follow up void. Monitoring for bleeding.   - Neuro: discontinue IV pain medication. Continue oral pain medication ibuprofen/tylenol, start gabapentin 300 mg q 8 hrs.  - Heme: repeat cbc this afternoon 1600   - DVT: continue SCD, Lovenox daily, encourage ambulation. Activity as tolerated   - home meds ordered - levothyroxine and metoprolol.    Vitals (last day) before discharge       Date/Time Temp Pulse Resp BP SpO2 Weight O2 Device O2 Flow Rate (L/min) [facility]    05/23/25 1115 -- 94 18 120/78 97 % -- None (Room air) --     05/23/25 0522 -- 89 -- 100/64 95 % -- None (Room air) -- MD    05/23/25 0326 99.2 °F (37.3 °C) 87 19 111/64 93 % -- None (Room air) --     05/22/25 1943 98.3 °F (36.8 °C) 96 18 122/85 98 % -- None (Room air) --     05/22/25 1126 98.6 °F (37 °C) 88 18 114/78 98 % -- None (Room air) --     05/22/25 0942 -- 82 16 107/65 98 % -- -- --     05/22/25 0411 98.8 °F (37.1 °C) 80 16 93/60 94 % -- None (Room air) -- KG         Blood Transfusion Record       Product Unit Status Volume Start End            Transfuse RBC       25  [id number]  [id number] Completed 05/21/25 1044 350 mL 05/21/25 0903 05/21/25 0913       25  [id number]  [id number] Completed 05/21/25 1044 350 mL 05/21/25 0853 05/21/25 0903                --------------  DISCHARGE REVIEW    Payor: [employer] OF STATE PPO  Subscriber #:  [id number]  Authorization Number: [id number]    Admit date: 5/21/25  Admit time:   5:51 AM  Discharge Date: 5/23/2025 12:16 PM     Admitting Physician: Sharri Leach DO  Attending Physician:  No att. providers found  Primary  Care Physician: Fadia Sheldon MD          Discharge Summary Notes        Discharge Summary signed by Sharri Leach DO at 2025 11:21 PM       Author: Sharri Leach DO Specialty: OBSTETRICS & GYNECOLOGY Author Type: Physician    Filed: 2025 11:21 PM Date of Service: 2025  8:36 AM Status: Addendum    : Sharri Leach DO (Physician)    Related Notes: Original Note by Sharri Leach DO (Physician) filed at 2025 12:57 PM           [facility]  part of [facility]    Discharge Summary    Pawel Duque Patient Status:  Inpatient    1980 MRN [id number]   Location [facility] 4W/SW/SE Attending Sharri Leach DO   Hosp Day # 2 PCP Fadia Sheldon MD     Admit Date: 2025    Discharge Date : 25    Attending Physician: Sharri Leach DO    Reason for Admission:  S/p abdominal myomectomy  Uterine fibroids    Procedures:  myomectomy    Hospital Course: patient was admitted for post-op management after abdmoinal myomectomy on 25. During surgery there was acute blood loss of 2800 mL, requiring 2 units of RBC transfusion intra-op. Her hemoglobin remained stable post-op with no further signs of ongoing hemorrhage and no need for any more blood products. Pain we well controlled with PCA then with PO pain medications. She reached all expected post-op milestones on post-op day 2 and was discharged home.    Discharge Diagnoses:   Post-operative state  Uterine fibroids  Acute blood loss anemia    Discharged Condition: good    Disposition: home    Patient Instructions:  Follow-up appointment with Dr. Leach in 2 weeks.    Sharri Leach DO  2025  8:36 AM    Electronically signed by Sharri Leach DO on 2025 11:21 PM         REVIEWER COMMENTS

## 2025-05-29 NOTE — PROGRESS NOTES
Hospital follow up.    Fadia Sheldon MD  Family Medicine  [place] [id number]  [phone]    Attempt #2:  Left message on voicemail for patient to call transitions specialist back to schedule follow up appointments. Provided Transitions specialist scheduling phone number [phone].

## 2025-05-30 NOTE — PROGRESS NOTES
Hospital follow up appt / Discharged 5/23 [facility] hosp    PCP Request :   Fadia Sheldon MD  Family Medicine  [place] [id number] [phone]  DECLINED - pt states that she' ok with her OBGYN follow-up appt on Thursday 6/05        No further assistance needed      Closing encounter

## 2025-06-05 ENCOUNTER — TELEMEDICINE (OUTPATIENT)
Dept: TELEHEALTH | Age: 45
End: 2025-06-05
Payer: COMMERCIAL

## 2025-06-05 DIAGNOSIS — R05.1 ACUTE COUGH: Primary | ICD-10-CM

## 2025-06-05 PROCEDURE — 98005 SYNCH AUDIO-VIDEO EST LOW 20: CPT | Performed by: PHYSICIAN ASSISTANT

## 2025-06-05 RX ORDER — BUDESONIDE AND FORMOTEROL FUMARATE DIHYDRATE 160; 4.5 UG/1; UG/1
2 AEROSOL RESPIRATORY (INHALATION) 2 TIMES DAILY
Qty: 6 G | Refills: 0 | Status: SHIPPED | OUTPATIENT
Start: 2025-06-05

## 2025-06-05 NOTE — PATIENT INSTRUCTIONS
Start daily antihistamine: Claritin, Zyrtec, Allegra are all good options. Generic is fine.  Consider starting nasal steroid spray daily (Flonase, Nasacort)  Rinse mouth after using inhaler   Follow up if symptoms worsen or new onset of fever

## 2025-06-05 NOTE — PROGRESS NOTES
Virtual/Telephone Check-In    Pawel Duque verbally consents to a Virtual/Telephone Check-In service on 06/05/25.  Patient has been referred to the [facility] website at [email or web address] to review the yearly Consent to Treat document.  Patient understands and accepts financial responsibility for any deductible, co-insurance and/or co-pays associated with this service.       Telehealth Verbal Consent   I conducted a telehealth visit with Pawel Duque today, 06/05/25, which was completed using two-way, real-time interactive audio and video communication. This has been done in good elise to provide continuity of care in the best interest of the provider-patient relationship, due to the COVID -19 public health crisis/national emergency where restrictions of face-to-face office visits are ongoing. Every conscious effort was taken to allow for sufficient and adequate time to complete the visit.  The patient was made aware of the limitations of the telehealth visit, including treatment limitations as no physical exam could be performed.  The patient was advised to call 911 or to go to the ER in case there was an emergency.  The patient was also advised of the potential privacy & security concerns related to the telehealth platform.   The patient was made aware of where to find [facility]'s notice of privacy practices, telehealth consent form and other related consent forms and documents.  which are located on the [facility] website. The patient verbally agreed to telehealth consent form, related consents and the risks discussed.    Lastly, the patient confirmed that they were in [place].   Included in this visit, time may have been spent reviewing labs, medications, radiology tests and decision making. Appropriate medical decision-making and tests are ordered as detailed in the plan of care above.  Coding/billing information is submitted for this visit based on complexity of care and/or time spent for the visit.    CHIEF  COMPLAINT:       Chief Complaint   Patient presents with    Cough             HPI:       Pawel Duque is a 44 year old female who presents for a video visit.  Patient reports cough for 2 days.  Had cough like this after having COVID.  Mucinex and cough drops not helping.  Reports no response in benzonatate.  She has used Dulera in the past and has worked well.  No wheezing or SOB.  Throat feels itchy.  Cough no productive.  Patient felt some throat discomfort in throat.  Cough is worse at night.   No issues with allergies in the past.  Does not feel sick overall.     Current Medications[1]   Past Medical History[2]   Past Surgical History[3]      Short Social Hx on File[4]      REVIEW OF SYSTEMS:   GENERAL: feels well overall  SKIN: no rashes or abnormal skin lesions  HEENT: See HPI  LUNGS: denies shortness of breath or wheezing, See HPI  CARDIOVASCULAR: denies chest pain or palpitations   GI: denies N/V/C or abdominal pain  NEURO: Denies headaches    EXAM:   General: Alert, Well-appearing, and In no acute distress  Respiratory:   Speaking in full sentences comfortably  Normal work of breathing  Coughing during visit  dry, throat clearing cough frequently throughout visit  Head: Normocephalic  Nose: No obvious nasal discharge.  Skin: No obvious rashes or lesions from what observed.     No results found for this or any previous visit (from the past 24 hours).    ASSESSMENT AND PLAN:   Pawel Duque is a 44 year old female who presents with symptoms that are consistent with    ASSESSMENT:   Encounter Diagnosis   Name Primary?    Acute cough Yes       PLAN: Discussed possible PND and trial of Zyrtec for 4-5 days first.  Patient inquiring about Dulera refill, seems to not be on patient's formulary.  Will send Symbicort/Breyno rx to see if this is covered and/or more cost effective.  Meds as below.  See patient Instructions    Meds & Refills for this Visit:  Requested Prescriptions     Signed Prescriptions  Disp Refills    Budesonide-Formoterol Fumarate (BREYNA) 160-4.5 MCG/ACT Inhalation Aerosol 6 g 0     Sig: Inhale 2 puffs into the lungs 2 (two) times daily.       Risks, benefits, and side effects of medication explained and discussed.    The patient indicates understanding of these issues and agrees to the plan.  The patient is asked to return if sx's persist or worsen.    Face to face time spent on Video Visit: 10:30  Total Time spent on visit including reviewing history, ordering labs/medication, patient examination and education: 16    Pawel Duque understands video visit evaluation is not a substitute for face-to-face examination or emergency care. Patient advised to go to ER or call 911 for worsening symptoms or acute distress.           [1]   Current Outpatient Medications   Medication Sig Dispense Refill    ibuprofen 600 MG Oral Tab Take 1 tablet (600 mg total) by mouth every 6 (six) hours as needed for Pain. 60 tablet 1    gabapentin 300 MG Oral Cap Take 1 capsule (300 mg total) by mouth every 8 (eight) hours as needed (moderate surgical pain). 30 capsule 0    ferrous sulfate 325 (65 FE) MG Oral Tab EC Take 1 tablet (325 mg total) by mouth 2 (two) times daily with meals. 60 tablet 1    docusate sodium 100 MG Oral Cap Take 1 capsule (100 mg total) by mouth 2 (two) times daily as needed for constipation. 60 capsule 1    ketoconazole 2 % External Cream Apply 1 Application topically 2 (two) times daily. 60 g 1    methylPREDNISolone 4 MG Oral Tab Take 1 tablet (4 mg total) by mouth daily.      levothyroxine 50 MCG Oral Tab Take 1 tablet (50 mcg total) by mouth before breakfast. 90 tablet 1    SUMAtriptan 50 MG Oral Tab Take 1 tablet (50 mg total) by mouth and may repeat in 2 (two) hours as needed for migraine.  Maximum 2 tablets in 24 hours. 9 tablet 1    metoprolol succinate ER 25 MG Oral Tablet 24 Hr Take 1 tablet (25 mg total) by mouth daily. 90 tablet 3   [2]   Past Medical History:   Disorder of  thyroid    Fibroids    High blood pressure    HTN (hypertension)    Hx of motion sickness    Hypothyroid    Migraine    Migraines   [3]   Past Surgical History:  Procedure Laterality Date    Breast biopsy Left     Hysterectomy      Kelsey localization wire 1 site left (cpt=19281)      Prior myomectomy     [4]   Social History  Socioeconomic History    Marital status:    Tobacco Use    Smoking status: Former     Current packs/day: 0.00     Types: Cigarettes     Quit date: 1/1/2015     Years since quitting: 10.4    Smokeless tobacco: Never   Vaping Use    Vaping status: Never Used   Substance and Sexual Activity    Alcohol use: Yes     Comment: month    Drug use: Not Currently    Sexual activity: Yes     Partners: Male     Social Drivers of Health     Food Insecurity: No Food Insecurity (5/21/2025)    NCSS - Food Insecurity     Worried About Running Out of Food in the Last Year: No     Ran Out of Food in the Last Year: No   Transportation Needs: No Transportation Needs (5/21/2025)    NCSS - Transportation     Lack of Transportation: No   Housing Stability: Not At Risk (5/21/2025)    NCSS - Housing/Utilities     Has Housing: Yes     Worried About Losing Housing: No     Unable to Get Utilities: No

## 2025-06-06 ENCOUNTER — TELEPHONE (OUTPATIENT)
Dept: OBGYN CLINIC | Facility: CLINIC | Age: 45
End: 2025-06-06

## 2025-06-06 NOTE — TELEPHONE ENCOUNTER
Incoming call from patient who missed her 2 week post-op appointment yesterday and would like to reschedule. Next available appointment isn't until 6/17/25.     Please assist

## 2025-06-09 ENCOUNTER — OFFICE VISIT (OUTPATIENT)
Dept: OBGYN CLINIC | Facility: CLINIC | Age: 45
End: 2025-06-09
Payer: COMMERCIAL

## 2025-06-09 ENCOUNTER — LAB ENCOUNTER (OUTPATIENT)
Dept: LAB | Facility: REFERENCE LAB | Age: 45
End: 2025-06-09
Attending: OBSTETRICS & GYNECOLOGY
Payer: COMMERCIAL

## 2025-06-09 VITALS
SYSTOLIC BLOOD PRESSURE: 118 MMHG | HEIGHT: 59 IN | DIASTOLIC BLOOD PRESSURE: 80 MMHG | BODY MASS INDEX: 27.42 KG/M2 | WEIGHT: 136 LBS

## 2025-06-09 DIAGNOSIS — D64.9 ANEMIA FOLLOWING SURGERY: ICD-10-CM

## 2025-06-09 DIAGNOSIS — Z48.89 ENCOUNTER FOR POST SURGICAL WOUND CHECK: ICD-10-CM

## 2025-06-09 DIAGNOSIS — D64.9 ANEMIA FOLLOWING SURGERY: Primary | ICD-10-CM

## 2025-06-09 LAB
DEPRECATED RDW RBC AUTO: 55.6 FL (ref 35.1–46.3)
ERYTHROCYTE [DISTWIDTH] IN BLOOD BY AUTOMATED COUNT: 15.6 % (ref 11–15)
HCT VFR BLD AUTO: 36.3 % (ref 35–48)
HGB BLD-MCNC: 11.7 G/DL (ref 12–16)
MCH RBC QN AUTO: 31.3 PG (ref 26–34)
MCHC RBC AUTO-ENTMCNC: 32.2 G/DL (ref 31–37)
MCV RBC AUTO: 97.1 FL (ref 80–100)
PLATELET # BLD AUTO: 384 10(3)UL (ref 150–450)
RBC # BLD AUTO: 3.74 X10(6)UL (ref 3.8–5.3)
WBC # BLD AUTO: 5.4 X10(3) UL (ref 4–11)

## 2025-06-09 PROCEDURE — 85027 COMPLETE CBC AUTOMATED: CPT | Performed by: OBSTETRICS & GYNECOLOGY

## 2025-06-09 NOTE — PROGRESS NOTES
RETURN GYN OFFICE VISIT  [facility]    CHIEF COMPLAINT:    Chief Complaint   Patient presents with    Post-Op     Pt states abd pain by incisions        HISTORY OF PRESENT ILLNESS:    CLARA is a 44 year old female  here for incision check s/p abdominal myomectomy on 25.    Pain is minimal, only a little soreness when moving at times.  Is on her period today. Feels like a pretty normal period.    No problems voiding or with BM.    Took the tegaderm off 2 weeks post-op.      REVIEW OF SYSTEMS:   CONSTITUTIONAL:  negative for fevers, chills, sweats and fatigue  GASTROINTESTINAL:  negative for nausea, vomiting, blood in stool, constipation, diarrhea and abdominal pain  GENITOURINARY: negative for no dysuria, urgency or frequency; no urinary incontinence; no hematuria  SKIN:  negative for  rash, skin lesion and pruritus  ENDOCRINE:  negative for acne, fatigue, weight gain and weight loss  BEHAVIOR/PSYCH:  negative for depressed mood, anhedonia and anxiety    CURRENT MEDICATIONS:    Medications - Current[1]    PAST MEDICAL, SOCIAL AND FAMILY HISTORY:    Past Medical History[2]  Past Surgical History[3]  Family History[4]  Social Hx on file[5]        PHYSICAL EXAM:   Patient's last menstrual period was 2025 (exact date).; Body mass index is 27.47 kg/m².      CONSTITUTIONAL:  Awake, alert, cooperative, no apparent distress  EYES: sclera clear and conjunctiva normal  GASTROINTESTINAL:  soft, non-distended, non-tender, no masses palpated  Pfannenstiel incision healing well with no erythema/induration and nontender.  SKIN:  No rashes  PSYCH:  Affect Normal    Pathology:  Uterine fibroids; myomectomy:   Fragments of leiomyoma (0.8 cm to 8.5 cm in greatest dimension).      ASSESSMENT AND PLAN:  1. Anemia following surgery  - recheck CBC today  - CBC, Platelet; No Differential; Future    2. Encounter for post surgical wound check  - reviewed continued post-op precautions. Ok to travel if desire, but  reviewed no rigorous activities - no ocean water, but soaking in a pool would be ok as long as it doesn't hurt / sting.    F/u 4 weeks for routine post-op.      Sharri Leach DO       [1]   Current Outpatient Medications:     Budesonide-Formoterol Fumarate (BREYNA) 160-4.5 MCG/ACT Inhalation Aerosol, Inhale 2 puffs into the lungs 2 (two) times daily., Disp: 6 g, Rfl: 0    ibuprofen 600 MG Oral Tab, Take 1 tablet (600 mg total) by mouth every 6 (six) hours as needed for Pain., Disp: 60 tablet, Rfl: 1    gabapentin 300 MG Oral Cap, Take 1 capsule (300 mg total) by mouth every 8 (eight) hours as needed (moderate surgical pain)., Disp: 30 capsule, Rfl: 0    ferrous sulfate 325 (65 FE) MG Oral Tab EC, Take 1 tablet (325 mg total) by mouth 2 (two) times daily with meals., Disp: 60 tablet, Rfl: 1    docusate sodium 100 MG Oral Cap, Take 1 capsule (100 mg total) by mouth 2 (two) times daily as needed for constipation., Disp: 60 capsule, Rfl: 1    ketoconazole 2 % External Cream, Apply 1 Application topically 2 (two) times daily., Disp: 60 g, Rfl: 1    methylPREDNISolone 4 MG Oral Tab, Take 1 tablet (4 mg total) by mouth daily., Disp: , Rfl:     levothyroxine 50 MCG Oral Tab, Take 1 tablet (50 mcg total) by mouth before breakfast., Disp: 90 tablet, Rfl: 1    SUMAtriptan 50 MG Oral Tab, Take 1 tablet (50 mg total) by mouth and may repeat in 2 (two) hours as needed for migraine.  Maximum 2 tablets in 24 hours., Disp: 9 tablet, Rfl: 1    metoprolol succinate ER 25 MG Oral Tablet 24 Hr, Take 1 tablet (25 mg total) by mouth daily., Disp: 90 tablet, Rfl: 3  [2]   Past Medical History:   Disorder of thyroid    Fibroids    High blood pressure    HTN (hypertension)    Hx of motion sickness    Hypothyroid    Migraine    Migraines   [3]   Past Surgical History:  Procedure Laterality Date    Breast biopsy Left     Hysterectomy      Kelsey localization wire 1 site left (cpt=19281)      Prior myomectomy     [4]   Family History  Problem  Relation Age of Onset    Diabetes Mother         pre-diabetes    Other (prostate problems) Father     Heart Disease Sister     No Known Problems Sister     No Known Problems Brother    [5]   Social History  Socioeconomic History    Marital status:    Tobacco Use    Smoking status: Former     Current packs/day: 0.00     Types: Cigarettes     Quit date: 1/1/2015     Years since quitting: 10.4    Smokeless tobacco: Never   Vaping Use    Vaping status: Never Used   Substance and Sexual Activity    Alcohol use: Yes     Comment: month    Drug use: Not Currently    Sexual activity: Yes     Partners: Male

## 2025-06-27 ENCOUNTER — PATIENT MESSAGE (OUTPATIENT)
Dept: OBGYN CLINIC | Facility: CLINIC | Age: 45
End: 2025-06-27

## 2025-06-30 NOTE — TELEPHONE ENCOUNTER
Incoming call from patient following up on the [employer] message sent regarding returning to work on the 8th. 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     Please assist.

## 2025-07-03 ENCOUNTER — OFFICE VISIT (OUTPATIENT)
Dept: OBGYN CLINIC | Facility: CLINIC | Age: 45
End: 2025-07-03
Payer: COMMERCIAL

## 2025-07-03 VITALS
DIASTOLIC BLOOD PRESSURE: 82 MMHG | BODY MASS INDEX: 27.62 KG/M2 | WEIGHT: 137 LBS | HEIGHT: 59 IN | SYSTOLIC BLOOD PRESSURE: 128 MMHG

## 2025-07-03 DIAGNOSIS — D64.9 ANEMIA, UNSPECIFIED TYPE: ICD-10-CM

## 2025-07-03 DIAGNOSIS — Z09 POSTOPERATIVE EXAMINATION: Primary | ICD-10-CM

## 2025-07-03 PROCEDURE — 3074F SYST BP LT 130 MM HG: CPT | Performed by: OBSTETRICS & GYNECOLOGY

## 2025-07-03 PROCEDURE — 3079F DIAST BP 80-89 MM HG: CPT | Performed by: OBSTETRICS & GYNECOLOGY

## 2025-07-03 PROCEDURE — 99024 POSTOP FOLLOW-UP VISIT: CPT | Performed by: OBSTETRICS & GYNECOLOGY

## 2025-07-03 PROCEDURE — 3008F BODY MASS INDEX DOCD: CPT | Performed by: OBSTETRICS & GYNECOLOGY

## 2025-07-03 NOTE — PROGRESS NOTES
RETURN GYN OFFICE VISIT  [facility]    CHIEF COMPLAINT:    Chief Complaint   Patient presents with    Post-Op     Pt c/o internal abd pain        HISTORY OF PRESENT ILLNESS:    CLARA is a 44 year old female  here for post-op appointment s/p abdominal myomectomy on 25.    Still having some intermittent pain.  Taking ibuprofen.    Peeing ok  Pooping ok.    Period:  Patient's last menstrual period was 2025 (exact date).  First 2 days normal, then day 3-4 was spotting. No cramps    Is excited to continue healing and get to the point she can try for pregnancy.    REVIEW OF SYSTEMS:   CONSTITUTIONAL:  negative for fevers, chills, sweats and fatigue  GASTROINTESTINAL:  negative for nausea, vomiting, blood in stool, constipation, diarrhea and abdominal pain  GENITOURINARY: negative for no dysuria, urgency or frequency; no urinary incontinence; no hematuria  SKIN:  negative for  rash, skin lesion and pruritus  ENDOCRINE:  negative for acne, fatigue, weight gain and weight loss  BEHAVIOR/PSYCH:  negative for depressed mood, anhedonia and anxiety    CURRENT MEDICATIONS:    Medications - Current[1]    PAST MEDICAL, SOCIAL AND FAMILY HISTORY:    Past Medical History[2]  Past Surgical History[3]  Family History[4]  Social Hx on file[5]        PHYSICAL EXAM:   Patient's last menstrual period was 2025 (exact date).; Body mass index is 27.67 kg/m².      CONSTITUTIONAL:  Awake, alert, cooperative, no apparent distress  EYES: sclera clear and conjunctiva normal  GASTROINTESTINAL:  soft, non-distended, non-tender, no masses palpated; incision well-healed and nontender  Uterine fundus - approximately 14 wk size, nontender.  PSYCH:  Affect Normal      ASSESSMENT AND PLAN:  1. Postoperative examination  - healing well. Still with pain requiring ibuprofen, agree to extend FMLA leave 2 additional weeks (8 weeks total).  - ok to start easing into regular activity in the next 2 weeks.  - Physical Therapy  Referral - External    2. Anemia, unspecified type  - CBC, Platelet; No Differential; Future    Fertiliyt planning - needs at least 6 months for uterine healing. F/u 6 months for pre-conception, plan for likely schedule femvue.    Sharri Leach DO         [1]   Current Outpatient Medications:     ferrous sulfate 325 (65 FE) MG Oral Tab EC, Take 1 tablet (325 mg total) by mouth 2 (two) times daily with meals., Disp: 60 tablet, Rfl: 1    docusate sodium 100 MG Oral Cap, Take 1 capsule (100 mg total) by mouth 2 (two) times daily as needed for constipation., Disp: 60 capsule, Rfl: 1    ketoconazole 2 % External Cream, Apply 1 Application topically 2 (two) times daily., Disp: 60 g, Rfl: 1    levothyroxine 50 MCG Oral Tab, Take 1 tablet (50 mcg total) by mouth before breakfast., Disp: 90 tablet, Rfl: 1    SUMAtriptan 50 MG Oral Tab, Take 1 tablet (50 mg total) by mouth and may repeat in 2 (two) hours as needed for migraine.  Maximum 2 tablets in 24 hours., Disp: 9 tablet, Rfl: 1    metoprolol succinate ER 25 MG Oral Tablet 24 Hr, Take 1 tablet (25 mg total) by mouth daily., Disp: 90 tablet, Rfl: 3    Budesonide-Formoterol Fumarate (BREYNA) 160-4.5 MCG/ACT Inhalation Aerosol, Inhale 2 puffs into the lungs 2 (two) times daily., Disp: 6 g, Rfl: 0    ibuprofen 600 MG Oral Tab, Take 1 tablet (600 mg total) by mouth every 6 (six) hours as needed for Pain., Disp: 60 tablet, Rfl: 1    gabapentin 300 MG Oral Cap, Take 1 capsule (300 mg total) by mouth every 8 (eight) hours as needed (moderate surgical pain)., Disp: 30 capsule, Rfl: 0    methylPREDNISolone 4 MG Oral Tab, Take 1 tablet (4 mg total) by mouth daily., Disp: , Rfl:   [2]   Past Medical History:   Disorder of thyroid    Fibroids    High blood pressure    HTN (hypertension)    Hx of motion sickness    Hypothyroid    Migraine    Migraines   [3]   Past Surgical History:  Procedure Laterality Date    Breast biopsy Left     Hysterectomy      Kelsey localization wire 1 site left  (cpt=19281)      Prior myomectomy     [4]   Family History  Problem Relation Age of Onset    Diabetes Mother         pre-diabetes    Other (prostate problems) Father     Heart Disease Sister     No Known Problems Sister     No Known Problems Brother    [5]   Social History  Socioeconomic History    Marital status:    Tobacco Use    Smoking status: Former     Current packs/day: 0.00     Types: Cigarettes     Quit date: 1/1/2015     Years since quitting: 10.5    Smokeless tobacco: Never   Vaping Use    Vaping status: Never Used   Substance and Sexual Activity    Alcohol use: Yes     Comment: month    Drug use: Not Currently    Sexual activity: Yes     Partners: Male

## 2025-07-14 ENCOUNTER — PATIENT OUTREACH (OUTPATIENT)
Age: 45
End: 2025-07-14

## 2025-07-15 ENCOUNTER — TELEMEDICINE (OUTPATIENT)
Dept: FAMILY MEDICINE CLINIC | Facility: CLINIC | Age: 45
End: 2025-07-15
Payer: COMMERCIAL

## 2025-07-15 DIAGNOSIS — R05.2 SUBACUTE COUGH: Primary | ICD-10-CM

## 2025-07-15 PROCEDURE — 98004 SYNCH AUDIO-VIDEO EST SF 10: CPT | Performed by: FAMILY MEDICINE

## 2025-07-15 NOTE — PROGRESS NOTES
This visit is conducted using Telemedicine with live, interactive video and audio.    Patient has been referred to the [facility] website at [email or web address] to review the yearly Consent to Treat document.    Patient understands and accepts financial responsibility for any deductible, co-insurance and/or co-pays associated with this service.     The following individual(s) verbally consented to be recorded using ambient AI listening technology and understand that they can each withdraw their consent to this listening technology at any point by asking the clinician to turn off or pause the recording:    Patient name: Pawel Duque     Subjective:   Pawel Duque is a 44 year old female who presents for No chief complaint on file.       History/Other:   History of Present Illness  Pawel Duque is a 44 year old female who presents with a persistent cough.    She has experienced a persistent, nonproductive cough since Barbara 3, 2025, primarily at night, disrupting her sleep for about five weeks. A similar episode occurred after a past COVID-19 infection, which resolved with an oral steroid and inhaler, but current treatments have been ineffective. The cough began a few days to two weeks postoperatively. She has tried Zyrtec, a combination steroid and long-acting beta agonist inhaler, albuterol, and benzonatate without significant relief. COVID-19 and RSV tests were negative. An antibiotic, likely amoxicillin, was prescribed by a nurse practitioner. The cough has slightly improved, occurring less frequently and with less intensity, but remains bothersome at night. No cold symptoms were present at onset.      No Further Nursing Notes to Review  Problem List Reviewed         Tobacco:  She smoked tobacco in the past but quit greater than 12 months ago.  Tobacco Use[1]     Current Medications[2]           Review of Systems:  See above      Objective:   LMP 06/30/2025 (Exact Date)    Estimated body mass  index is 27.67 kg/m² as calculated from the following:    Height as of 7/3/25: 4' 11\" (1.499 m).    Weight as of 7/3/25: 137 lb (62.1 kg).  Results  RADIOLOGY  No results found.       Physical Exam  Physical Exam  GENERAL: Alert, well developed, well nourished, no acute distress.  CHEST: Clear to auscultation bilaterally, non-labored respiratory effort.      Assessment & Plan:   1. Subacute cough (Primary)      Assessment & Plan  Assessment & Plan  Chronic Cough  Persistent nocturnal cough post-surgery, unresponsive to multiple treatments, but improving. Differential includes postnasal drip, reflux, or viral bronchitis. Negative COVID and RSV tests. Possible viral bronchitis with some improvement noted.  - Use an over-the-counter expectorant (such as Mucinex) as needed for symptomatic relief.  - Restart cetirizine once daily for potential allergies.  - Avoid eating or drinking within two hours of bedtime to reduce reflux risk.  - Monitor symptoms for one week; if no improvement, consider chest x-ray and further evaluation for reflux or allergies.            No follow-ups on file.      Fadia Sheldon MD              [1]   Social History  Tobacco Use   Smoking Status Former    Current packs/day: 0.00    Types: Cigarettes    Quit date: 1/1/2015    Years since quitting: 10.5   Smokeless Tobacco Never   [2]   Current Outpatient Medications   Medication Sig Dispense Refill    Budesonide-Formoterol Fumarate (BREYNA) 160-4.5 MCG/ACT Inhalation Aerosol Inhale 2 puffs into the lungs 2 (two) times daily. 6 g 0    ibuprofen 600 MG Oral Tab Take 1 tablet (600 mg total) by mouth every 6 (six) hours as needed for Pain. 60 tablet 1    gabapentin 300 MG Oral Cap Take 1 capsule (300 mg total) by mouth every 8 (eight) hours as needed (moderate surgical pain). 30 capsule 0    ferrous sulfate 325 (65 FE) MG Oral Tab EC Take 1 tablet (325 mg total) by mouth 2 (two) times daily with meals. 60 tablet 1    docusate sodium 100 MG Oral Cap  Take 1 capsule (100 mg total) by mouth 2 (two) times daily as needed for constipation. 60 capsule 1    levothyroxine 50 MCG Oral Tab Take 1 tablet (50 mcg total) by mouth before breakfast. 90 tablet 1    SUMAtriptan 50 MG Oral Tab Take 1 tablet (50 mg total) by mouth and may repeat in 2 (two) hours as needed for migraine.  Maximum 2 tablets in 24 hours. 9 tablet 1    metoprolol succinate ER 25 MG Oral Tablet 24 Hr Take 1 tablet (25 mg total) by mouth daily. 90 tablet 3

## 2025-07-21 ENCOUNTER — PATIENT OUTREACH (OUTPATIENT)
Age: 45
End: 2025-07-21

## 2025-07-21 NOTE — PROGRESS NOTES
Community Health Worker Hypertension [facility]nered Path   Outreach Call on 07/21/25  PCP: Fadia Sheldon MD     Call Details:   The CHW called the patient and explained the CHW services and the program. The patient declined the services and the program.       Initial Outreach    July 21, 2025    [facility] Details:   The patient chart indicates that the patient meets the criteria for the Hypertension Program,   however the patient declined the services    Medication Adherence and Affordability:   The CHW did not discuss any Medication Adherence and Affordability due to the patient declining services and the program.       Nutrition and Lifestyle Education:   The CHW did not discuss any Nutrition and Lifestyle Education due to the patient declining services and the program.     Hypertension Outreach Outcomes:   The CHW was successful in speaking with the patient, however the patient declined services and the program    Notes: The CHW closed out this patient due to the patient declining the services and the program.     The patient took the CHW Department number in the event the patience changes their mind.     Signed by Genevieve PADILLA

## 2025-07-21 NOTE — PROGRESS NOTES
Community Health Worker Hypertension [facility]nered Path   Outreach Call on 07/21/25  PCP: Fadia Sheldon MD     Call Details:   The CHW called the patient and explained the CHW services and the program. The patient declined the services and the program.     The patient stated patient does not have high blood pressure and the diagnosis was issued during a previous pregnancy. The patient followed doctors orders during pregnancy and has not had any issues.          Initial Outreach    July 21 2025     [facility] Details:   The patient chart indicates that the patient meets the criteria for the Hypertension Program, however the patient declined the services.       Medication Adherence and Affordability:   The CHW did not discuss any Medication Adherence and Affordability due to the patient declining services and the program    Nutrition and Lifestyle Education:   The CHW did not discuss any Nutrition and Lifestyle Education due to the patient declining services and the program.       Hypertension Outreach Outcomes:   The CHW was successful in speaking with the patient, however the patient declined services and the program.      Notes: The CHW closed out this patient due to the patient declining the services and the program.   The patient took the CHW Department number in the event the patience changes their mind.     Signed by Genevieve PADILLA

## (undated) DEVICE — HANDLE SUR BLU PLAS LT FLX SLIP ON ST DISP

## (undated) DEVICE — SKIN PREP TRAY 4 COMPARTM TRAY: Brand: MEDLINE INDUSTRIES, INC.

## (undated) DEVICE — ABSORBABLE HEMOSTAT (OXIDIZED REGENERATED CELLULOSE): Brand: SURGICEL

## (undated) DEVICE — CLEANER,CAUTERY TIP,2X2",STERILE: Brand: MEDLINE

## (undated) DEVICE — SUT MCRYL 4-0 18IN PS-2 ABSRB UD 19MM 3/8 CIR

## (undated) DEVICE — DRAPE,T,LAPARO,TRANS,STERILE: Brand: MEDLINE

## (undated) DEVICE — SPONGE LAP 18X18IN WHT COT 4 PLY FLD STRUNG

## (undated) DEVICE — GLOVE,SURG,SENSICARE,ALOE,LF,PF,7: Brand: MEDLINE

## (undated) DEVICE — SUT PLN GUT 2-0 27IN CT ABSRB TAN YELLOWISH 4

## (undated) DEVICE — 3M™ TEGADERM™ TRANSPARENT FILM DRESSING FRAME STYLE, 1628, 6 IN X 8 IN (15 CM X 20 CM), 10/CT 8CT/CASE: Brand: 3M™ TEGADERM™

## (undated) DEVICE — NEEDLE BLNT 18GA L1.5IN FILL DISP PRECISGLDE

## (undated) DEVICE — SHEET,DRAPE,53X77,STERILE: Brand: MEDLINE

## (undated) DEVICE — LAPAROTOMY: Brand: MEDLINE INDUSTRIES, INC.

## (undated) DEVICE — UNDERPANTS MAT 2XL FOR 24-64IN WAIST PUR

## (undated) DEVICE — SUT COAT VCRL 0 27IN CT-1 ABSRB VLT 36MM 1/2

## (undated) DEVICE — ZZ-CONVERTED-TO-524825-ADHESIVE SKIN TOP FOR WND CLSR DERMBND ADV

## (undated) DEVICE — SOLUTION IRRIG 1000ML ST H2O AQUALITE PLAS

## (undated) DEVICE — SYRINGE MED 50ML LL TIP DISP

## (undated) DEVICE — SUT VCRL 2-0 36IN CT-1 ABSRB UD L36MM 1/2 CIR

## (undated) DEVICE — SUT VCRL 0 L18IN ABSRB VLT POLYGLACTIN 910

## (undated) DEVICE — TOWEL,OR,DSP,ST,BLUE,DLX,2/PK,40PK/CS: Brand: MEDLINE

## (undated) DEVICE — 3M(TM) MEDIPORE(TM) H SOFT CLOTH TAPE 2866: Brand: 3M™ MEDIPORE™

## (undated) NOTE — LETTER
25    RE: Pawel Duque    : 1980    To Whom It May Concern:    Our patient, Pawel Duque,      __x___Has received treatment in our office on ___7/3/25___________________.        _____Has been hospitalized on the following dates ______________________.       _____ Has been ill and unable to work from _____________________________.        _____ May resume work / school on __ with no restrictions_________.      _____ May resume work / school with the following restrictions ______________    __________________________________________________________.      _____ May participate in physical education.      _____ May participate in a prenatal exercise class / yoga class.      _____ Patient is pregnant with a due date of Estimated Date of Delivery: None noted.      _____ Other ___patient still recovering from major abdominal surgery and needs an additional 2 weeks before returning to work.  Formal \"forms department\" will fill out official FMLA paperwork._________________________       Sincerely yours,        Sharri Leach, DO

## (undated) NOTE — LETTER
6/28/2024          To Whom It May Concern:    Pawel Duque is currently under my medical care. She may return to work 6/29/2024 but with following restrictions shich will be reevaluated in 3 weeks at follow up appt:  No lifting greater than 10  lbs.  No overhead work.  No pushing or pulling greater than 20 lbs.    If you require additional information please contact our office.        Sincerely,    Fadia Sheldon MD          Document generated by:  Fadia Sheldon MD

## (undated) NOTE — LETTER
7/18/2024          To Whom It May Concern:      Pawel Duque is currently under my medical care. She may return to work 6/29/2024 but with following restrictions shich will be reevaluated in 3 weeks at follow up appt:  No lifting greater than 10  lbs.  No overhead work.  No pushing or pulling greater than 20 lbs.    If you require additional information please contact our office.        Sincerely,     Fadia Sheldon MD          Document generated by:  Fadia Sheldon MD